# Patient Record
Sex: FEMALE | Race: WHITE | NOT HISPANIC OR LATINO
[De-identification: names, ages, dates, MRNs, and addresses within clinical notes are randomized per-mention and may not be internally consistent; named-entity substitution may affect disease eponyms.]

---

## 2018-04-30 ENCOUNTER — RESULT REVIEW (OUTPATIENT)
Age: 23
End: 2018-04-30

## 2018-04-30 ENCOUNTER — OUTPATIENT (OUTPATIENT)
Dept: OUTPATIENT SERVICES | Facility: HOSPITAL | Age: 23
LOS: 1 days | Discharge: HOME | End: 2018-04-30

## 2018-04-30 VITALS
RESPIRATION RATE: 21 BRPM | SYSTOLIC BLOOD PRESSURE: 108 MMHG | HEART RATE: 97 BPM | OXYGEN SATURATION: 99 % | DIASTOLIC BLOOD PRESSURE: 58 MMHG

## 2018-04-30 VITALS
OXYGEN SATURATION: 100 % | DIASTOLIC BLOOD PRESSURE: 60 MMHG | RESPIRATION RATE: 18 BRPM | TEMPERATURE: 99 F | WEIGHT: 220.46 LBS | HEART RATE: 68 BPM | SYSTOLIC BLOOD PRESSURE: 108 MMHG

## 2018-04-30 LAB
ABO RH CONFIRMATION: SIGNIFICANT CHANGE UP
BLD GP AB SCN SERPL QL: SIGNIFICANT CHANGE UP
TYPE + AB SCN PNL BLD: SIGNIFICANT CHANGE UP

## 2018-04-30 RX ORDER — SODIUM CHLORIDE 9 MG/ML
1000 INJECTION, SOLUTION INTRAVENOUS
Refills: 0 | Status: DISCONTINUED | OUTPATIENT
Start: 2018-04-30 | End: 2018-05-15

## 2018-04-30 RX ORDER — ONDANSETRON 8 MG/1
4 TABLET, FILM COATED ORAL ONCE
Refills: 0 | Status: DISCONTINUED | OUTPATIENT
Start: 2018-04-30 | End: 2018-05-15

## 2018-04-30 RX ORDER — ACETAMINOPHEN 500 MG
1000 TABLET ORAL ONCE
Refills: 0 | Status: COMPLETED | OUTPATIENT
Start: 2018-04-30 | End: 2018-04-30

## 2018-04-30 RX ORDER — KETOROLAC TROMETHAMINE 30 MG/ML
30 SYRINGE (ML) INJECTION ONCE
Refills: 0 | Status: DISCONTINUED | OUTPATIENT
Start: 2018-04-30 | End: 2018-04-30

## 2018-04-30 RX ADMIN — Medication 1000 MILLIGRAM(S): at 13:15

## 2018-04-30 RX ADMIN — Medication 400 MILLIGRAM(S): at 12:45

## 2018-04-30 RX ADMIN — SODIUM CHLORIDE 75 MILLILITER(S): 9 INJECTION, SOLUTION INTRAVENOUS at 13:02

## 2018-04-30 NOTE — BRIEF OPERATIVE NOTE - OPERATION/FINDINGS
8 week size, anteverted uterus, normal appearing vaginal and cervix. Moderate amount of tissue on curettage.

## 2018-04-30 NOTE — ASU DISCHARGE PLAN (ADULT/PEDIATRIC). - ASU FOLLOWUP
911 or go to the nearest Emergency Room [facility]:  [facility] Ambulatory [facility]...

## 2018-04-30 NOTE — BRIEF OPERATIVE NOTE - PROCEDURE
<<-----Click on this checkbox to enter Procedure Dilation and curettage of uterus for missed   2018    Active  APULLANO

## 2018-04-30 NOTE — H&P PST ADULT - HISTORY OF PRESENT ILLNESS
23 yo  at 11w0d GA by LMP of 18 here for scheduled suction dilation and curettage for missed . She denies acute complaints, bleeding, or cramping. She feels well overall. She had 2 transvaginal sonograms last Thursday that both supported the diagnosis of missed .    OB: , FT NSVDx2, SAB x1  GYN: Denies history of fibroids, cysts, abnormal paps, or STIs.

## 2018-05-03 DIAGNOSIS — O02.1 MISSED ABORTION: ICD-10-CM

## 2018-05-03 LAB — SURGICAL PATHOLOGY STUDY: SIGNIFICANT CHANGE UP

## 2018-05-21 LAB — CHROM ANALY OVERALL INTERP SPEC-IMP: SIGNIFICANT CHANGE UP

## 2018-05-23 PROBLEM — Z00.00 ENCOUNTER FOR PREVENTIVE HEALTH EXAMINATION: Noted: 2018-05-23

## 2019-06-01 ENCOUNTER — INPATIENT (INPATIENT)
Facility: HOSPITAL | Age: 24
LOS: 1 days | Discharge: HOME | End: 2019-06-03
Attending: OBSTETRICS & GYNECOLOGY | Admitting: OBSTETRICS & GYNECOLOGY
Payer: MEDICAID

## 2019-06-01 VITALS — TEMPERATURE: 98 F

## 2019-06-01 NOTE — OB PROVIDER TRIAGE NOTE - NS_CONTRACTPATTER_OBGYN_ALL_OB
October 3, 2017    Quique Martinez  [address] [place] [address]             [facility]  [address] [place] [address]  Phone: [phone] To whom it may concern:    Fabiano was seen in my office on 10/2/17. His ear was scratched while doing his exam.     If you have any questions or concerns, please don't hesitate to call.    Sincerely,        Anisha Coates MD     
Regular

## 2019-06-01 NOTE — OB PROVIDER TRIAGE NOTE - NSHPLABSRESULTS_GEN_ALL_CORE
Type and Screen: O neg s/p rhogam 2/27  Antibody screen: neg   Rubella: immune  Varicella: immune   RPR: neg  HbSAg: neg  HIV: NR  Measles: nonimmune    Second Trimester:  1 hour Glucola: 106    Third Trimester:  HIV: NR  GBS: neg

## 2019-06-01 NOTE — OB RN TRIAGE NOTE - NS_PARA_OBGYN_ALL_OB_NU
UA results complete.  Please advise.  Thanks!    Component Results     Component Value Ref Range & Units Status Collected Lab   COLOR YELLOW YELLOW Final 09/20/2017 12:30 PM [facility]   APPEARANCE CLOUDY  Final 09/20/2017 12:30 PM [facility]   GLUCOSE(URINE) NEGATIVE NEGATIVE mg/dL Final 09/20/2017 12:30 PM [facility]   BILIRUBIN NEGATIVE NEGATIVE Final 09/20/2017 12:30 PM [facility]   KETONES NEGATIVE NEGATIVE mg/dL Final 09/20/2017 12:30 PM [facility]   SPECIFIC GRAVITY 1.020 1.005 - 1.030 Final 09/20/2017 12:30 PM [facility]   BLOOD MODERATE  NEGATIVE Final 09/20/2017 12:30 PM [facility]   pH 5.5 5.0 - 7.0 Units Final 09/20/2017 12:30 PM [facility]   PROTEIN(URINE) 30  NEGATIVE mg/dL Final 09/20/2017 12:30 PM [facility]   UROBILINOGEN 0.2 0.0 - 1.0 mg/dL Final 09/20/2017 12:30 PM [facility]   NITRITE NEGATIVE NEGATIVE Final 09/20/2017 12:30 PM [facility]   LEUKOCYTE ESTERASE SMALL  NEGATIVE Final 09/20/2017 12:30 PM [facility]   Comment:   Culture indicated, results to follow.   Squamous EPI'S NONE SEEN 0 - 5 /hpf Final 09/20/2017 12:30 PM [facility]   RBC 1 to 3 0 - 3 /hpf Final 09/20/2017 12:30 PM [facility]   WBC 26 to 100 0 - 5 /hpf Final 09/20/2017 12:30 PM [facility]   Comment:   Culture indicated, results to follow.   BACTERIA MODERATE  NONE SEEN /hpf Final 09/20/2017 12:30 PM [facility]   Hyaline Casts NONE SEEN 0 - 5 /lpf Final 09/20/2017 12:30 PM [facility]   SPECIMEN TYPE             2

## 2019-06-01 NOTE — OB PROVIDER TRIAGE NOTE - NS_OBGYNHISTORY_OBGYN_ALL_OB_FT
ObHx:  x2, largest 6lb, SAB x2  GynHx: Denies history of STIs, abnormal pap, ovarian cysts, or fibroids

## 2019-06-01 NOTE — OB PROVIDER TRIAGE NOTE - NSHPPHYSICALEXAM_GEN_ALL_CORE
PHYSICAL EXAM:  T(F): 98.6 (06-01 @ 22:40)  HR: 88 (06-01 @ 22:40)  BP: 117/72 (06-01 @ 22:40)  RR: 18 (06-01 @ 22:40)  Constitutional: AAOx3, NAD  Abdomen: Soft, gravid, nontender, no palpable ctx  EFM: 140, mod deena, +accel  [place]: q5mins  SVE: 3/70/-2, vtx, intact

## 2019-06-01 NOTE — OB RN TRIAGE NOTE - CHIEF COMPLAINT QUOTE
pt is 40 2/, , lost her mucous plug this morning at 9am, and started having consistent contractions since 4pm

## 2019-06-01 NOTE — OB PROVIDER TRIAGE NOTE - HISTORY OF PRESENT ILLNESS
23  @ 40.2wks here with contractions, q5mins, 4-5/10 pain, started at this morning. Denies LOF or VB. Good fetal movement. Denies complications with the pregnancy.

## 2019-06-01 NOTE — OB PROVIDER TRIAGE NOTE - NSOBPROVIDERNOTE_OBGYN_ALL_OB_FT
23  @ 40.2wks, GBS pos, in early labor, desires ambulation  -labor precautions given  -ambulate for 2 hours then return for evaluation  -[facility] instructions given 23  @ 40.2wks, GBS pos, in early labor, with reassuring maternal and fetal wellbeing, desires ambulation  -labor precautions given  -ambulate for 2 hours then return for evaluation  -[facility] instructions given 23  @ 40.2wks, GBS pos, in early labor, with reassuring maternal and fetal wellbeing, desires ambulation  -labor precautions given  -ambulate for 2 hours then return for evaluation  -[facility] instructions given  As above, patient will return post ambulation

## 2019-06-02 LAB
ALLERGY+IMMUNOLOGY DIAG STUDY NOTE: SIGNIFICANT CHANGE UP
APPEARANCE UR: ABNORMAL
BACTERIA # UR AUTO: ABNORMAL /HPF
BASOPHILS # BLD AUTO: 0.03 K/UL — SIGNIFICANT CHANGE UP (ref 0–0.2)
BASOPHILS NFR BLD AUTO: 0.4 % — SIGNIFICANT CHANGE UP (ref 0–1)
BILIRUB UR-MCNC: NEGATIVE — SIGNIFICANT CHANGE UP
BLD GP AB SCN SERPL QL: SIGNIFICANT CHANGE UP
COLOR SPEC: YELLOW — SIGNIFICANT CHANGE UP
DIFF PNL FLD: NEGATIVE — SIGNIFICANT CHANGE UP
DIR ANTIGLOB POLYSPECIFIC INTERPRETATION: SIGNIFICANT CHANGE UP
EOSINOPHIL # BLD AUTO: 0.04 K/UL — SIGNIFICANT CHANGE UP (ref 0–0.7)
EOSINOPHIL NFR BLD AUTO: 0.5 % — SIGNIFICANT CHANGE UP (ref 0–8)
EPI CELLS # UR: ABNORMAL /HPF
GLUCOSE UR QL: NEGATIVE MG/DL — SIGNIFICANT CHANGE UP
HCT VFR BLD CALC: 32.4 % — LOW (ref 37–47)
HCT VFR BLD CALC: 34.1 % — LOW (ref 37–47)
HGB BLD-MCNC: 10.1 G/DL — LOW (ref 12–16)
HGB BLD-MCNC: 10.9 G/DL — LOW (ref 12–16)
IMM GRANULOCYTES NFR BLD AUTO: 0.4 % — HIGH (ref 0.1–0.3)
KETONES UR-MCNC: NEGATIVE — SIGNIFICANT CHANGE UP
LEUKOCYTE ESTERASE UR-ACNC: ABNORMAL
LYMPHOCYTES # BLD AUTO: 1.79 K/UL — SIGNIFICANT CHANGE UP (ref 1.2–3.4)
LYMPHOCYTES # BLD AUTO: 21.2 % — SIGNIFICANT CHANGE UP (ref 20.5–51.1)
MCHC RBC-ENTMCNC: 23.6 PG — LOW (ref 27–31)
MCHC RBC-ENTMCNC: 23.9 PG — LOW (ref 27–31)
MCHC RBC-ENTMCNC: 31.2 G/DL — LOW (ref 32–37)
MCHC RBC-ENTMCNC: 32 G/DL — SIGNIFICANT CHANGE UP (ref 32–37)
MCV RBC AUTO: 74.6 FL — LOW (ref 81–99)
MCV RBC AUTO: 75.7 FL — LOW (ref 81–99)
MONOCYTES # BLD AUTO: 0.51 K/UL — SIGNIFICANT CHANGE UP (ref 0.1–0.6)
MONOCYTES NFR BLD AUTO: 6 % — SIGNIFICANT CHANGE UP (ref 1.7–9.3)
NEUTROPHILS # BLD AUTO: 6.05 K/UL — SIGNIFICANT CHANGE UP (ref 1.4–6.5)
NEUTROPHILS NFR BLD AUTO: 71.5 % — SIGNIFICANT CHANGE UP (ref 42.2–75.2)
NITRITE UR-MCNC: NEGATIVE — SIGNIFICANT CHANGE UP
NRBC # BLD: 0 /100 WBCS — SIGNIFICANT CHANGE UP (ref 0–0)
NRBC # BLD: 0 /100 WBCS — SIGNIFICANT CHANGE UP (ref 0–0)
PH UR: 6.5 — SIGNIFICANT CHANGE UP (ref 5–8)
PLATELET # BLD AUTO: 153 K/UL — SIGNIFICANT CHANGE UP (ref 130–400)
PLATELET # BLD AUTO: 180 K/UL — SIGNIFICANT CHANGE UP (ref 130–400)
PRENATAL SYPHILIS TEST: SIGNIFICANT CHANGE UP
PROT UR-MCNC: ABNORMAL MG/DL
RBC # BLD: 4.28 M/UL — SIGNIFICANT CHANGE UP (ref 4.2–5.4)
RBC # BLD: 4.57 M/UL — SIGNIFICANT CHANGE UP (ref 4.2–5.4)
RBC # FLD: 20.3 % — HIGH (ref 11.5–14.5)
RBC # FLD: 20.5 % — HIGH (ref 11.5–14.5)
SP GR SPEC: 1.01 — SIGNIFICANT CHANGE UP (ref 1.01–1.03)
UROBILINOGEN FLD QL: 0.2 MG/DL — SIGNIFICANT CHANGE UP (ref 0.2–0.2)
WBC # BLD: 7.13 K/UL — SIGNIFICANT CHANGE UP (ref 4.8–10.8)
WBC # BLD: 8.45 K/UL — SIGNIFICANT CHANGE UP (ref 4.8–10.8)
WBC # FLD AUTO: 7.13 K/UL — SIGNIFICANT CHANGE UP (ref 4.8–10.8)
WBC # FLD AUTO: 8.45 K/UL — SIGNIFICANT CHANGE UP (ref 4.8–10.8)
WBC UR QL: >50 /HPF

## 2019-06-02 PROCEDURE — 59400 OBSTETRICAL CARE: CPT | Mod: U9

## 2019-06-02 RX ORDER — TETANUS TOXOID, REDUCED DIPHTHERIA TOXOID AND ACELLULAR PERTUSSIS VACCINE, ADSORBED 5; 2.5; 8; 8; 2.5 [IU]/.5ML; [IU]/.5ML; UG/.5ML; UG/.5ML; UG/.5ML
0.5 SUSPENSION INTRAMUSCULAR ONCE
Refills: 0 | Status: DISCONTINUED | OUTPATIENT
Start: 2019-06-02 | End: 2019-06-03

## 2019-06-02 RX ORDER — SODIUM CHLORIDE 9 MG/ML
3 INJECTION INTRAMUSCULAR; INTRAVENOUS; SUBCUTANEOUS EVERY 8 HOURS
Refills: 0 | Status: DISCONTINUED | OUTPATIENT
Start: 2019-06-02 | End: 2019-06-03

## 2019-06-02 RX ORDER — IBUPROFEN 200 MG
600 TABLET ORAL EVERY 6 HOURS
Refills: 0 | Status: DISCONTINUED | OUTPATIENT
Start: 2019-06-02 | End: 2019-06-03

## 2019-06-02 RX ORDER — DIPHENHYDRAMINE HCL 50 MG
25 CAPSULE ORAL EVERY 6 HOURS
Refills: 0 | Status: DISCONTINUED | OUTPATIENT
Start: 2019-06-02 | End: 2019-06-03

## 2019-06-02 RX ORDER — OXYCODONE HYDROCHLORIDE 5 MG/1
5 TABLET ORAL
Refills: 0 | Status: DISCONTINUED | OUTPATIENT
Start: 2019-06-02 | End: 2019-06-03

## 2019-06-02 RX ORDER — MAGNESIUM HYDROXIDE 400 MG/1
30 TABLET, CHEWABLE ORAL
Refills: 0 | Status: DISCONTINUED | OUTPATIENT
Start: 2019-06-02 | End: 2019-06-03

## 2019-06-02 RX ORDER — DOCUSATE SODIUM 100 MG
100 CAPSULE ORAL
Refills: 0 | Status: DISCONTINUED | OUTPATIENT
Start: 2019-06-02 | End: 2019-06-03

## 2019-06-02 RX ORDER — SODIUM CHLORIDE 9 MG/ML
1000 INJECTION, SOLUTION INTRAVENOUS
Refills: 0 | Status: DISCONTINUED | OUTPATIENT
Start: 2019-06-02 | End: 2019-06-02

## 2019-06-02 RX ORDER — BENZOCAINE 10 %
1 GEL (GRAM) MUCOUS MEMBRANE EVERY 6 HOURS
Refills: 0 | Status: DISCONTINUED | OUTPATIENT
Start: 2019-06-02 | End: 2019-06-03

## 2019-06-02 RX ORDER — GLYCERIN ADULT
1 SUPPOSITORY, RECTAL RECTAL AT BEDTIME
Refills: 0 | Status: DISCONTINUED | OUTPATIENT
Start: 2019-06-02 | End: 2019-06-03

## 2019-06-02 RX ORDER — AER TRAVELER 0.5 G/1
1 SOLUTION RECTAL; TOPICAL EVERY 4 HOURS
Refills: 0 | Status: DISCONTINUED | OUTPATIENT
Start: 2019-06-02 | End: 2019-06-03

## 2019-06-02 RX ORDER — OXYTOCIN 10 UNIT/ML
2 VIAL (ML) INJECTION
Qty: 30 | Refills: 0 | Status: DISCONTINUED | OUTPATIENT
Start: 2019-06-02 | End: 2019-06-02

## 2019-06-02 RX ORDER — SIMETHICONE 80 MG/1
80 TABLET, CHEWABLE ORAL EVERY 4 HOURS
Refills: 0 | Status: DISCONTINUED | OUTPATIENT
Start: 2019-06-02 | End: 2019-06-03

## 2019-06-02 RX ORDER — IBUPROFEN 200 MG
600 TABLET ORAL EVERY 6 HOURS
Refills: 0 | Status: COMPLETED | OUTPATIENT
Start: 2019-06-02 | End: 2020-04-30

## 2019-06-02 RX ORDER — LANOLIN
1 OINTMENT (GRAM) TOPICAL EVERY 6 HOURS
Refills: 0 | Status: DISCONTINUED | OUTPATIENT
Start: 2019-06-02 | End: 2019-06-03

## 2019-06-02 RX ORDER — HYDROCORTISONE 1 %
1 OINTMENT (GRAM) TOPICAL EVERY 6 HOURS
Refills: 0 | Status: DISCONTINUED | OUTPATIENT
Start: 2019-06-02 | End: 2019-06-03

## 2019-06-02 RX ORDER — OXYCODONE HYDROCHLORIDE 5 MG/1
5 TABLET ORAL ONCE
Refills: 0 | Status: DISCONTINUED | OUTPATIENT
Start: 2019-06-02 | End: 2019-06-03

## 2019-06-02 RX ORDER — OXYTOCIN 10 UNIT/ML
333.33 VIAL (ML) INJECTION
Qty: 20 | Refills: 0 | Status: DISCONTINUED | OUTPATIENT
Start: 2019-06-02 | End: 2019-06-03

## 2019-06-02 RX ORDER — PRAMOXINE HYDROCHLORIDE 150 MG/15G
1 AEROSOL, FOAM RECTAL EVERY 4 HOURS
Refills: 0 | Status: DISCONTINUED | OUTPATIENT
Start: 2019-06-02 | End: 2019-06-02

## 2019-06-02 RX ORDER — DIBUCAINE 1 %
1 OINTMENT (GRAM) RECTAL EVERY 6 HOURS
Refills: 0 | Status: DISCONTINUED | OUTPATIENT
Start: 2019-06-02 | End: 2019-06-03

## 2019-06-02 RX ORDER — KETOROLAC TROMETHAMINE 30 MG/ML
30 SYRINGE (ML) INJECTION ONCE
Refills: 0 | Status: DISCONTINUED | OUTPATIENT
Start: 2019-06-02 | End: 2019-06-02

## 2019-06-02 RX ORDER — ACETAMINOPHEN 500 MG
975 TABLET ORAL EVERY 6 HOURS
Refills: 0 | Status: DISCONTINUED | OUTPATIENT
Start: 2019-06-02 | End: 2019-06-03

## 2019-06-02 RX ADMIN — SODIUM CHLORIDE 3 MILLILITER(S): 9 INJECTION INTRAMUSCULAR; INTRAVENOUS; SUBCUTANEOUS at 14:32

## 2019-06-02 RX ADMIN — Medication 600 MILLIGRAM(S): at 21:12

## 2019-06-02 RX ADMIN — Medication 975 MILLIGRAM(S): at 23:30

## 2019-06-02 RX ADMIN — Medication 1 TABLET(S): at 11:58

## 2019-06-02 RX ADMIN — Medication 975 MILLIGRAM(S): at 18:22

## 2019-06-02 RX ADMIN — Medication 975 MILLIGRAM(S): at 11:58

## 2019-06-02 RX ADMIN — Medication 30 MILLIGRAM(S): at 08:55

## 2019-06-02 RX ADMIN — SODIUM CHLORIDE 3 MILLILITER(S): 9 INJECTION INTRAMUSCULAR; INTRAVENOUS; SUBCUTANEOUS at 22:14

## 2019-06-02 NOTE — OB PROVIDER H&P - ASSESSMENT
24yo  at 40w3d, GBS negative, measles non-immune, in labor    -Admit to labor and delivery  -IV hydration and clear liquid diet  -Pain management PRN  -Cont efm and toco  -Rhogam PP  -MMR PP    Dr. Domínguez and Dr. Sosa aware

## 2019-06-02 NOTE — PROGRESS NOTE ADULT - SUBJECTIVE AND OBJECTIVE BOX
PGY2 Note:    Pt evaluated at bedside, pain well controlled with epidural in place.     Vital Signs Last 24 Hrs  T(F): 98.6 (2019 22:40), Max: 98.6 (2019 22:40)  HR: 62 (2019 03:54) (62 - 88)  BP: 102/55 (2019 03:54) (102/55 - 128/80)  RR: 18 (2019 22:40) (18 - 18)    EFM: 120/mod/accels  [place]: irregular  SVE: last exam 3-4/90/-1 per Dr. TAMMY Sosa    Labs:                        10.9   8.45  )-----------( 180      ( 2019 01:10 )             34.1     Urinalysis Basic - ( 2019 01:10 )    Color: Yellow / Appearance: Cloudy / S.015 / pH: x  Gluc: x / Ketone: Negative  / Bili: Negative / Urobili: 0.2 mg/dL   Blood: x / Protein: Trace mg/dL / Nitrite: Negative   Leuk Esterase: Large / RBC: x / WBC >50 /HPF   Sq Epi: x / Non Sq Epi: Moderate /HPF / Bacteria: Moderate /HPF    Prenatal Syphilis Test (19 @ 01:10)    Prenatal Syphilis Test: Nonreact: Test Performed at:  [address]  [address]    Type + Screen (19 @ 01:10)    ABO RH Interpretation: AB NEG    UDS pending      Meds:  Epidural in place

## 2019-06-02 NOTE — OB PROVIDER H&P - HISTORY OF PRESENT ILLNESS
22yo  at 40w3d by LMP c/w first trimester sonogram s/p ambulation with contractions, q5mins, 4-5/10 pain, started at this morning. Denies LOF or VB. Good fetal movement. Denies complications with the pregnancy. GBS negative

## 2019-06-02 NOTE — PROGRESS NOTE ADULT - ASSESSMENT
24yo  at 40w3d, GBS negative, measles non-immune, in labor    -Anticipate delivery  -IV hydration and clear liquid diet  -Pain management PRN  -Cont efm and toco  -Rhogam PP  -MMR PP    Dr. Stone and Dr. Sosa aware

## 2019-06-02 NOTE — PROGRESS NOTE ADULT - SUBJECTIVE AND OBJECTIVE BOX
PGY1 Note    Patient seen at bedside for evaluation, doing well, no complaints    T(F): 98.6 ( @ 22:40), Max: 98.6 ( @ 22:40)  HR: 75 ( @ 06:24)  BP: 119/61 ( @ 06:24) (83/53 - 138/61)  RR: 18 ( @ 22:40)  EFM: 115/mod/spontaneous late decelerations with margaret of 90 recovering to baseline  with resuscitation   TOCO: q 2-3min  SVE: 10/100/+1  Medications:  Epidural    Labs:                        10.9   8.45  )-----------( 180      ( 2019 01:10 )             34.1           Prenatal Syphilis Test: Nonreact ( @ 01:10)  Antibody Screen: POS (19 @ 01:10)  Antibody Interpretation 1: Anti-D due to RhIG Admin (19 @ 01:10)    Urinalysis Basic - ( 2019 01:10 )    Color: Yellow / Appearance: Cloudy / S.015 / pH: x  Gluc: x / Ketone: Negative  / Bili: Negative / Urobili: 0.2 mg/dL   Blood: x / Protein: Trace mg/dL / Nitrite: Negative   Leuk Esterase: Large / RBC: x / WBC >50 /HPF   Sq Epi: x / Non Sq Epi: Moderate /HPF / Bacteria: Moderate /HPF

## 2019-06-02 NOTE — PROCEDURE NOTE - ADDITIONAL PROCEDURE DETAILS
Epidural catheter removed intact.  Return to sensorimotor baseline.  No apparent anesthesia-related complications.

## 2019-06-02 NOTE — OB PROVIDER H&P - ATTENDING COMMENTS
24 y/o p2022 at 40.3 wks w/c/o irreg ctx, +fm, no rom, no bleeding.    pnc: nsd x 2, largest 6+ lbs  gbs neg  abd: a=0698 g, nt  ext: no edema  cx: 3-4/90/-1/i/adeq  nst: reactive  toco: irreg ctx  admit, analgesia, arom, anticipate nsd 22 y/o p2022 at 40.3 wks w/c/o irreg ctx, +fm, no rom, no bleeding.    pnc: nsd x 2, largest 6+ lbs  gbs neg, rh neg, got rhogam, measles non immune  abd: g=0624 g, nt  ext: no edema  cx: 3-4/90/-1/i/adeq  nst: reactive  toco: irreg ctx  admit, analgesia, arom, anticipate nsd 24 y/o p2022 at 40.3 wks w/c/o irreg ctx, +fm, no rom, no bleeding.    pnc: nsd x 2, largest 6+ lbs  gbs neg, rh neg, got rhogam, measles non immune  abd: q=9347 g, nt  ext: no edema  cx: 3-4/90/-1/i/adeq  nst: reactive  toco: irreg ctx  admit, analgesia, arom, pitocin prn, r/b/a reviewed, ques answered, anticipate nsd

## 2019-06-02 NOTE — OB PROVIDER H&P - NSHPLABSRESULTS_GEN_ALL_CORE
10/22/18   Type and Screen: O neg s/p rhogam 2/27  Antibody screen: neg   Rubella: immune  Varicella: immune   RPR: neg  HbSAg: neg  HIV: NR  Measles: nonimmune    Second Trimester:  1 hour Glucola: 106    Third Trimester:  HIV: NR  GBS: neg    sonogram  1/29/19 3vc, 4ch, post placenta, MVP 5.34cm, EFW 480g (20%)

## 2019-06-02 NOTE — PROGRESS NOTE ADULT - ASSESSMENT
24yo  at 40w3d, GBS negative, measles non-immune, epidural in place, in labor  -Continuous EFM and toco  -IV fluids  -Pain management PRN  -Pitocin for labor augmentation    Discussed with Dr. TAMMY Sosa

## 2019-06-02 NOTE — OB PROVIDER DELIVERY SUMMARY - NSPROVIDERDELIVERYNOTE_OBGYN_ALL_OB_FT
Patient fully dilated, OA, pushed to deliver viable male .  Apgar 9/9.  Placenta intact with 3vc.  Cervix, vagina, perineum intact.   cc.  Tolerated well.  Infant to [facility].

## 2019-06-02 NOTE — OB PROVIDER IHI INDUCTION/AUGMENTATION NOTE - NS_CHECKALL_OBGYN_ALL_OB
H&P was completed/Contractions pattern was reviewed/FHR was reviewed/Induction / Augmentation was discussed/Order was written

## 2019-06-02 NOTE — OB PROVIDER H&P - NSHPPHYSICALEXAM_GEN_ALL_CORE
Physical exam:  Vital Signs Last 24 Hrs  T(F): 98.6 (01 Jun 2019 22:40), Max: 98.6 (01 Jun 2019 22:40)  HR: 88 (02 Jun 2019 00:53) (88 - 88)  BP: 128/80 (02 Jun 2019 00:53) (117/72 - 128/80)  RR: 18 (01 Jun 2019 22:40) (18 - 18)  Udip trace protein, trace leuks  Gen: NAD  Abdomen: Soft, nontender, gravid  VE: 3.5/90/-1 vertex, intact per Dr. Sosa   EFM: 120/mod/+accels  toco: q3-5min  BPP 8/8

## 2019-06-03 ENCOUNTER — TRANSCRIPTION ENCOUNTER (OUTPATIENT)
Age: 24
End: 2019-06-03

## 2019-06-03 VITALS
TEMPERATURE: 97 F | SYSTOLIC BLOOD PRESSURE: 120 MMHG | DIASTOLIC BLOOD PRESSURE: 67 MMHG | RESPIRATION RATE: 18 BRPM | HEART RATE: 80 BPM

## 2019-06-03 LAB
AMPHET UR-MCNC: NEGATIVE — SIGNIFICANT CHANGE UP
BARBITURATES UR SCN-MCNC: NEGATIVE — SIGNIFICANT CHANGE UP
BASOPHILS # BLD AUTO: 0.04 K/UL — SIGNIFICANT CHANGE UP (ref 0–0.2)
BASOPHILS NFR BLD AUTO: 0.7 % — SIGNIFICANT CHANGE UP (ref 0–1)
BENZODIAZ UR-MCNC: NEGATIVE — SIGNIFICANT CHANGE UP
BLD GP AB SCN SERPL QL: SIGNIFICANT CHANGE UP
BUPRENORPHINE SCREEN, URINE RESULT: NEGATIVE — SIGNIFICANT CHANGE UP
COCAINE METAB.OTHER UR-MCNC: NEGATIVE — SIGNIFICANT CHANGE UP
EOSINOPHIL # BLD AUTO: 0.1 K/UL — SIGNIFICANT CHANGE UP (ref 0–0.7)
EOSINOPHIL NFR BLD AUTO: 1.7 % — SIGNIFICANT CHANGE UP (ref 0–8)
FETAL SCREEN: SIGNIFICANT CHANGE UP
HCT VFR BLD CALC: 34.6 % — LOW (ref 37–47)
HGB BLD-MCNC: 10.6 G/DL — LOW (ref 12–16)
IMM GRANULOCYTES NFR BLD AUTO: 0.3 % — SIGNIFICANT CHANGE UP (ref 0.1–0.3)
KLEIHAUER-BETKE CALCULATION: 0 % — SIGNIFICANT CHANGE UP (ref 0–0.3)
L&D DRUG SCREEN, URINE: SIGNIFICANT CHANGE UP
LYMPHOCYTES # BLD AUTO: 1.78 K/UL — SIGNIFICANT CHANGE UP (ref 1.2–3.4)
LYMPHOCYTES # BLD AUTO: 29.4 % — SIGNIFICANT CHANGE UP (ref 20.5–51.1)
MCHC RBC-ENTMCNC: 23.3 PG — LOW (ref 27–31)
MCHC RBC-ENTMCNC: 30.6 G/DL — LOW (ref 32–37)
MCV RBC AUTO: 76.2 FL — LOW (ref 81–99)
METHADONE UR-MCNC: NEGATIVE — SIGNIFICANT CHANGE UP
MONOCYTES # BLD AUTO: 0.41 K/UL — SIGNIFICANT CHANGE UP (ref 0.1–0.6)
MONOCYTES NFR BLD AUTO: 6.8 % — SIGNIFICANT CHANGE UP (ref 1.7–9.3)
NEUTROPHILS # BLD AUTO: 3.7 K/UL — SIGNIFICANT CHANGE UP (ref 1.4–6.5)
NEUTROPHILS NFR BLD AUTO: 61.1 % — SIGNIFICANT CHANGE UP (ref 42.2–75.2)
NRBC # BLD: 0 /100 WBCS — SIGNIFICANT CHANGE UP (ref 0–0)
OPIATES UR-MCNC: NEGATIVE — SIGNIFICANT CHANGE UP
OXYCODONE UR-MCNC: NEGATIVE — SIGNIFICANT CHANGE UP
PCP UR-MCNC: NEGATIVE — SIGNIFICANT CHANGE UP
PLATELET # BLD AUTO: 146 K/UL — SIGNIFICANT CHANGE UP (ref 130–400)
PROPOXYPHENE QUALITATIVE URINE RESULT: NEGATIVE — SIGNIFICANT CHANGE UP
RBC # BLD: 4.54 M/UL — SIGNIFICANT CHANGE UP (ref 4.2–5.4)
RBC # FLD: 20.6 % — HIGH (ref 11.5–14.5)
WBC # BLD: 6.05 K/UL — SIGNIFICANT CHANGE UP (ref 4.8–10.8)
WBC # FLD AUTO: 6.05 K/UL — SIGNIFICANT CHANGE UP (ref 4.8–10.8)

## 2019-06-03 PROCEDURE — 86077 PHYS BLOOD BANK SERV XMATCH: CPT

## 2019-06-03 RX ORDER — IBUPROFEN 200 MG
1 TABLET ORAL
Qty: 0 | Refills: 0 | DISCHARGE
Start: 2019-06-03

## 2019-06-03 RX ORDER — ACETAMINOPHEN 500 MG
3 TABLET ORAL
Qty: 0 | Refills: 0 | DISCHARGE
Start: 2019-06-03

## 2019-06-03 RX ADMIN — Medication 600 MILLIGRAM(S): at 09:06

## 2019-06-03 RX ADMIN — Medication 975 MILLIGRAM(S): at 05:51

## 2019-06-03 RX ADMIN — Medication 0.5 MILLILITER(S): at 12:15

## 2019-06-03 RX ADMIN — Medication 975 MILLIGRAM(S): at 12:42

## 2019-06-03 RX ADMIN — Medication 1 TABLET(S): at 12:42

## 2019-06-03 NOTE — PROGRESS NOTE ADULT - SUBJECTIVE AND OBJECTIVE BOX
Subjective:   Patient doing well. No complaints. Minimal lochia. Pain controlled.    Objective:   T(F): 97.3 ( @ 07:42), Max: 97.4 (-02 @ 15:34)  HR: 80 ( @ 07:42)  BP: 120/67 ( @ 07:42) (108/54 - 122/69)  RR: 18 ( @ 07:42)  SpO2: --  Gen: AAOx3, NAD  Abd: Soft, Nontender, Nondistended, Fundus firm below the umbilicus  Ext: no tender, mild edema  Min Lochia Rubra    Labs:                        10.6   6.05  )-----------( 146      ( 2019 04:55 )             34.6             Tolerating regular diet  Passed flatus, passed bowel movement  Breast/Bottle feeding    Assessment:   23y s/p , PPD#1, doing well    Plan:  -Routine postpartum care  -Encouraged ambulation and PO hydration  -Tolerating regular diet

## 2019-06-03 NOTE — DISCHARGE NOTE OB - ADDITIONAL INSTRUCTIONS
Nothing in the vagina for 6 weeks. No tampons, no douching, no sex. No swimming, no tub-baths. May shower.  May remove dressings in 2days.  If fever 100.4F or greater, excessive vaginal bleeding, or severe abdominal pain, call your Ob/Gyn or come to ED or call 911.

## 2019-06-03 NOTE — DISCHARGE NOTE OB - CARE PLAN
Principal Discharge DX:	Vaginal delivery  Goal:	healthy patient  Assessment and plan of treatment:	normal vaginal delivery and postpartum course  f/u in office in 6 weeks

## 2019-06-03 NOTE — DISCHARGE NOTE OB - CARE PROVIDER_API CALL
Acosta Sosa)  Obstetrics and Gynecology  [address] [place] [address]  Phone: [phone]  Fax: [phone]  Follow Up Time: 1 month

## 2019-06-03 NOTE — DISCHARGE NOTE OB - PATIENT PORTAL LINK FT
You can access the [facility] Patient Portal, offered by [facility], by registering with the following website: [email or web address]

## 2019-06-06 DIAGNOSIS — Z34.83 ENCOUNTER FOR SUPERVISION OF OTHER NORMAL PREGNANCY, THIRD TRIMESTER: ICD-10-CM

## 2020-03-09 ENCOUNTER — APPOINTMENT (OUTPATIENT)
Dept: ANTEPARTUM | Facility: CLINIC | Age: 25
End: 2020-03-09

## 2020-03-11 ENCOUNTER — APPOINTMENT (OUTPATIENT)
Dept: ANTEPARTUM | Facility: CLINIC | Age: 25
End: 2020-03-11
Payer: COMMERCIAL

## 2020-03-11 ENCOUNTER — ASOB RESULT (OUTPATIENT)
Age: 25
End: 2020-03-11

## 2020-03-11 ENCOUNTER — RESULT REVIEW (OUTPATIENT)
Age: 25
End: 2020-03-11

## 2020-03-11 ENCOUNTER — OUTPATIENT (OUTPATIENT)
Dept: OUTPATIENT SERVICES | Facility: HOSPITAL | Age: 25
LOS: 1 days | Discharge: HOME | End: 2020-03-11
Payer: COMMERCIAL

## 2020-03-11 ENCOUNTER — OUTPATIENT (OUTPATIENT)
Dept: OUTPATIENT SERVICES | Facility: HOSPITAL | Age: 25
LOS: 1 days | Discharge: HOME | End: 2020-03-11

## 2020-03-11 VITALS
RESPIRATION RATE: 18 BRPM | WEIGHT: 100.09 LBS | HEIGHT: 60 IN | DIASTOLIC BLOOD PRESSURE: 58 MMHG | TEMPERATURE: 99 F | OXYGEN SATURATION: 100 % | HEART RATE: 85 BPM | SYSTOLIC BLOOD PRESSURE: 110 MMHG

## 2020-03-11 VITALS
OXYGEN SATURATION: 100 % | HEART RATE: 85 BPM | SYSTOLIC BLOOD PRESSURE: 121 MMHG | RESPIRATION RATE: 16 BRPM | DIASTOLIC BLOOD PRESSURE: 69 MMHG

## 2020-03-11 DIAGNOSIS — Z98.890 OTHER SPECIFIED POSTPROCEDURAL STATES: Chronic | ICD-10-CM

## 2020-03-11 LAB — BLD GP AB SCN SERPL QL: SIGNIFICANT CHANGE UP

## 2020-03-11 PROCEDURE — 59820 CARE OF MISCARRIAGE: CPT

## 2020-03-11 PROCEDURE — 76801 OB US < 14 WKS SINGLE FETUS: CPT | Mod: 26

## 2020-03-11 PROCEDURE — 76817 TRANSVAGINAL US OBSTETRIC: CPT | Mod: 26

## 2020-03-11 PROCEDURE — 88305 TISSUE EXAM BY PATHOLOGIST: CPT | Mod: 26

## 2020-03-11 RX ORDER — SODIUM CHLORIDE 9 MG/ML
1000 INJECTION, SOLUTION INTRAVENOUS
Refills: 0 | Status: DISCONTINUED | OUTPATIENT
Start: 2020-03-11 | End: 2020-03-26

## 2020-03-11 RX ORDER — ONDANSETRON 8 MG/1
4 TABLET, FILM COATED ORAL ONCE
Refills: 0 | Status: DISCONTINUED | OUTPATIENT
Start: 2020-03-11 | End: 2020-03-26

## 2020-03-11 RX ORDER — HYDROMORPHONE HYDROCHLORIDE 2 MG/ML
0.5 INJECTION INTRAMUSCULAR; INTRAVENOUS; SUBCUTANEOUS
Refills: 0 | Status: DISCONTINUED | OUTPATIENT
Start: 2020-03-11 | End: 2020-03-11

## 2020-03-11 RX ORDER — OXYCODONE AND ACETAMINOPHEN 5; 325 MG/1; MG/1
1 TABLET ORAL ONCE
Refills: 0 | Status: DISCONTINUED | OUTPATIENT
Start: 2020-03-11 | End: 2020-03-11

## 2020-03-11 RX ADMIN — SODIUM CHLORIDE 100 MILLILITER(S): 9 INJECTION, SOLUTION INTRAVENOUS at 14:46

## 2020-03-11 NOTE — ASU DISCHARGE PLAN (ADULT/PEDIATRIC) - CARE PROVIDER_API CALL
[place])  Obstetrics and Gynecology  [address] [place] [address]  Phone: [phone]  Fax: [phone]  Follow Up Time:

## 2020-03-11 NOTE — CHART NOTE - NSCHARTNOTEFT_GEN_A_CORE
PACU ANESTHESIA ADMISSION NOTE      Procedure: Dilation and curettage, uterus, using suction, for missed first trimester     Post op diagnosis:  Missed       ____  Intubated  TV:______       Rate: ______      FiO2: ______    __x__  Patent Airway    __x__  Full return of protective reflexes    __x__  Full recovery from anesthesia / back to baseline status    Vitals  HR: 110  BP: 122/65  RR: 18  O2 Sat: 99%  Temp: 97.8    Mental Status:  __x__ Awake   ___x__ Alert   _____ Drowsy   _____ Sedated    Nausea/Vomiting:  __x__ NO  ______Yes,   See Post - Op Orders          Pain Scale (0-10):  _____    Treatment: ____ None    __x__ See Post - Op/PCA Orders    Post - Operative Fluids:   ____ Oral   __x__ See Post - Op Orders    Plan: Discharge when criteria met:   __x__Home       _____Floor     _____Critical Care    _____  Other:_________________    Comments: Patient had smooth intraoperative event, no anesthesia complication.

## 2020-03-11 NOTE — H&P PST ADULT - HISTORY OF PRESENT ILLNESS
23yo  with unknown LMP here for scheduled dilation and curettage for missed . Sonogram on 3/11 demonstrated a gestational sac measuring 4mm. Denies any episodes of VB or cramping.    ObHx: 3 FT   2 SAB with D&Cx1    GynHx: Denies h/o fibroids, cysts, abnormal paps or STIs.

## 2020-03-11 NOTE — BRIEF OPERATIVE NOTE - NSICDXBRIEFPROCEDURE_GEN_ALL_CORE_FT
PROCEDURES:  Dilation and curettage, uterus, using suction, for missed first trimester  11-Mar-2020 14:21:21  Chery Yang

## 2020-03-11 NOTE — H&P PST ADULT - ASSESSMENT
A/P: 25yo  with missed  measuring 4mm, for scheduled suction dilation and curettage  - on call to OR

## 2020-03-11 NOTE — PRE-ANESTHESIA EVALUATION ADULT - NSANTHOSAYNRD_GEN_A_CORE
No. KALE screening performed.  STOP BANG Legend: 0-2 = LOW Risk; 3-4 = INTERMEDIATE Risk; 5-8 = HIGH Risk

## 2020-03-13 LAB — SURGICAL PATHOLOGY STUDY: SIGNIFICANT CHANGE UP

## 2020-03-16 DIAGNOSIS — O36.80X0 PREGNANCY WITH INCONCLUSIVE FETAL VIABILITY, NOT APPLICABLE OR UNSPECIFIED: ICD-10-CM

## 2020-03-16 DIAGNOSIS — O36.80X1 PREGNANCY WITH INCONCLUSIVE FETAL VIABILITY, FETUS 1: ICD-10-CM

## 2020-03-16 DIAGNOSIS — Z78.9 OTHER SPECIFIED HEALTH STATUS: ICD-10-CM

## 2020-03-18 DIAGNOSIS — O02.1 MISSED ABORTION: ICD-10-CM

## 2020-03-31 PROBLEM — Z78.9 OTHER SPECIFIED HEALTH STATUS: Chronic | Status: ACTIVE | Noted: 2019-06-01

## 2020-09-02 ENCOUNTER — ASOB RESULT (OUTPATIENT)
Age: 25
End: 2020-09-02

## 2020-09-02 ENCOUNTER — APPOINTMENT (OUTPATIENT)
Dept: ANTEPARTUM | Facility: CLINIC | Age: 25
End: 2020-09-02
Payer: COMMERCIAL

## 2020-09-02 PROCEDURE — 76817 TRANSVAGINAL US OBSTETRIC: CPT

## 2020-09-02 PROCEDURE — 76811 OB US DETAILED SNGL FETUS: CPT

## 2020-12-30 ENCOUNTER — INPATIENT (INPATIENT)
Facility: HOSPITAL | Age: 25
LOS: 0 days | Discharge: HOME | End: 2020-12-31
Attending: OBSTETRICS & GYNECOLOGY | Admitting: OBSTETRICS & GYNECOLOGY
Payer: MEDICAID

## 2020-12-30 VITALS
SYSTOLIC BLOOD PRESSURE: 116 MMHG | TEMPERATURE: 99 F | HEART RATE: 74 BPM | DIASTOLIC BLOOD PRESSURE: 74 MMHG | RESPIRATION RATE: 16 BRPM

## 2020-12-30 DIAGNOSIS — Z98.890 OTHER SPECIFIED POSTPROCEDURAL STATES: Chronic | ICD-10-CM

## 2020-12-30 LAB
ALLERGY+IMMUNOLOGY DIAG STUDY NOTE: SIGNIFICANT CHANGE UP
AMPHET UR-MCNC: NEGATIVE — SIGNIFICANT CHANGE UP
APPEARANCE UR: CLEAR — SIGNIFICANT CHANGE UP
BARBITURATES UR SCN-MCNC: NEGATIVE — SIGNIFICANT CHANGE UP
BASOPHILS # BLD AUTO: 0.02 K/UL — SIGNIFICANT CHANGE UP (ref 0–0.2)
BASOPHILS # BLD AUTO: 0.04 K/UL — SIGNIFICANT CHANGE UP (ref 0–0.2)
BASOPHILS NFR BLD AUTO: 0.3 % — SIGNIFICANT CHANGE UP (ref 0–1)
BASOPHILS NFR BLD AUTO: 0.5 % — SIGNIFICANT CHANGE UP (ref 0–1)
BENZODIAZ UR-MCNC: NEGATIVE — SIGNIFICANT CHANGE UP
BILIRUB UR-MCNC: NEGATIVE — SIGNIFICANT CHANGE UP
BLD GP AB SCN SERPL QL: SIGNIFICANT CHANGE UP
BUPRENORPHINE SCREEN, URINE RESULT: NEGATIVE — SIGNIFICANT CHANGE UP
COCAINE METAB.OTHER UR-MCNC: NEGATIVE — SIGNIFICANT CHANGE UP
COLOR SPEC: SIGNIFICANT CHANGE UP
DIFF PNL FLD: NEGATIVE — SIGNIFICANT CHANGE UP
DIR ANTIGLOB POLYSPECIFIC INTERPRETATION: SIGNIFICANT CHANGE UP
EOSINOPHIL # BLD AUTO: 0.02 K/UL — SIGNIFICANT CHANGE UP (ref 0–0.7)
EOSINOPHIL # BLD AUTO: 0.06 K/UL — SIGNIFICANT CHANGE UP (ref 0–0.7)
EOSINOPHIL NFR BLD AUTO: 0.3 % — SIGNIFICANT CHANGE UP (ref 0–8)
EOSINOPHIL NFR BLD AUTO: 0.9 % — SIGNIFICANT CHANGE UP (ref 0–8)
FENTANYL UR QL: NEGATIVE — SIGNIFICANT CHANGE UP
FETAL SCREEN: SIGNIFICANT CHANGE UP
GLUCOSE UR QL: NEGATIVE — SIGNIFICANT CHANGE UP
HCT VFR BLD CALC: 31.4 % — LOW (ref 37–47)
HCT VFR BLD CALC: 33.4 % — LOW (ref 37–47)
HGB BLD-MCNC: 10.2 G/DL — LOW (ref 12–16)
HGB BLD-MCNC: 9.5 G/DL — LOW (ref 12–16)
IMM GRANULOCYTES NFR BLD AUTO: 0.4 % — HIGH (ref 0.1–0.3)
IMM GRANULOCYTES NFR BLD AUTO: 0.4 % — HIGH (ref 0.1–0.3)
KETONES UR-MCNC: NEGATIVE — SIGNIFICANT CHANGE UP
L&D DRUG SCREEN, URINE: SIGNIFICANT CHANGE UP
LEUKOCYTE ESTERASE UR-ACNC: NEGATIVE — SIGNIFICANT CHANGE UP
LYMPHOCYTES # BLD AUTO: 1.1 K/UL — LOW (ref 1.2–3.4)
LYMPHOCYTES # BLD AUTO: 1.77 K/UL — SIGNIFICANT CHANGE UP (ref 1.2–3.4)
LYMPHOCYTES # BLD AUTO: 13.8 % — LOW (ref 20.5–51.1)
LYMPHOCYTES # BLD AUTO: 25.4 % — SIGNIFICANT CHANGE UP (ref 20.5–51.1)
MCHC RBC-ENTMCNC: 21.8 PG — LOW (ref 27–31)
MCHC RBC-ENTMCNC: 21.8 PG — LOW (ref 27–31)
MCHC RBC-ENTMCNC: 30.3 G/DL — LOW (ref 32–37)
MCHC RBC-ENTMCNC: 30.5 G/DL — LOW (ref 32–37)
MCV RBC AUTO: 71.4 FL — LOW (ref 81–99)
MCV RBC AUTO: 72.2 FL — LOW (ref 81–99)
METHADONE UR-MCNC: NEGATIVE — SIGNIFICANT CHANGE UP
MONOCYTES # BLD AUTO: 0.44 K/UL — SIGNIFICANT CHANGE UP (ref 0.1–0.6)
MONOCYTES # BLD AUTO: 0.54 K/UL — SIGNIFICANT CHANGE UP (ref 0.1–0.6)
MONOCYTES NFR BLD AUTO: 6.3 % — SIGNIFICANT CHANGE UP (ref 1.7–9.3)
MONOCYTES NFR BLD AUTO: 6.8 % — SIGNIFICANT CHANGE UP (ref 1.7–9.3)
NEUTROPHILS # BLD AUTO: 4.65 K/UL — SIGNIFICANT CHANGE UP (ref 1.4–6.5)
NEUTROPHILS # BLD AUTO: 6.26 K/UL — SIGNIFICANT CHANGE UP (ref 1.4–6.5)
NEUTROPHILS NFR BLD AUTO: 66.7 % — SIGNIFICANT CHANGE UP (ref 42.2–75.2)
NEUTROPHILS NFR BLD AUTO: 78.2 % — HIGH (ref 42.2–75.2)
NITRITE UR-MCNC: NEGATIVE — SIGNIFICANT CHANGE UP
NRBC # BLD: 0 /100 WBCS — SIGNIFICANT CHANGE UP (ref 0–0)
NRBC # BLD: 0 /100 WBCS — SIGNIFICANT CHANGE UP (ref 0–0)
OPIATES UR-MCNC: NEGATIVE — SIGNIFICANT CHANGE UP
OXYCODONE UR-MCNC: NEGATIVE — SIGNIFICANT CHANGE UP
PCP UR-MCNC: NEGATIVE — SIGNIFICANT CHANGE UP
PH UR: 6.5 — SIGNIFICANT CHANGE UP (ref 5–8)
PLATELET # BLD AUTO: 154 K/UL — SIGNIFICANT CHANGE UP (ref 130–400)
PLATELET # BLD AUTO: 162 K/UL — SIGNIFICANT CHANGE UP (ref 130–400)
PRENATAL SYPHILIS TEST: SIGNIFICANT CHANGE UP
PROPOXYPHENE QUALITATIVE URINE RESULT: NEGATIVE — SIGNIFICANT CHANGE UP
PROT UR-MCNC: SIGNIFICANT CHANGE UP
RBC # BLD: 4.35 M/UL — SIGNIFICANT CHANGE UP (ref 4.2–5.4)
RBC # BLD: 4.68 M/UL — SIGNIFICANT CHANGE UP (ref 4.2–5.4)
RBC # FLD: 15.8 % — HIGH (ref 11.5–14.5)
RBC # FLD: 15.9 % — HIGH (ref 11.5–14.5)
SARS-COV-2 RNA SPEC QL NAA+PROBE: SIGNIFICANT CHANGE UP
SP GR SPEC: 1.01 — SIGNIFICANT CHANGE UP (ref 1.01–1.03)
UROBILINOGEN FLD QL: SIGNIFICANT CHANGE UP
WBC # BLD: 6.97 K/UL — SIGNIFICANT CHANGE UP (ref 4.8–10.8)
WBC # BLD: 7.99 K/UL — SIGNIFICANT CHANGE UP (ref 4.8–10.8)
WBC # FLD AUTO: 6.97 K/UL — SIGNIFICANT CHANGE UP (ref 4.8–10.8)
WBC # FLD AUTO: 7.99 K/UL — SIGNIFICANT CHANGE UP (ref 4.8–10.8)

## 2020-12-30 PROCEDURE — 86077 PHYS BLOOD BANK SERV XMATCH: CPT

## 2020-12-30 PROCEDURE — 59409 OBSTETRICAL CARE: CPT | Mod: U9

## 2020-12-30 RX ORDER — SODIUM CHLORIDE 9 MG/ML
3 INJECTION INTRAMUSCULAR; INTRAVENOUS; SUBCUTANEOUS EVERY 8 HOURS
Refills: 0 | Status: DISCONTINUED | OUTPATIENT
Start: 2020-12-30 | End: 2020-12-31

## 2020-12-30 RX ORDER — OXYTOCIN 10 UNIT/ML
333.33 VIAL (ML) INJECTION
Qty: 20 | Refills: 0 | Status: DISCONTINUED | OUTPATIENT
Start: 2020-12-30 | End: 2020-12-31

## 2020-12-30 RX ORDER — DIBUCAINE 1 %
1 OINTMENT (GRAM) RECTAL EVERY 6 HOURS
Refills: 0 | Status: DISCONTINUED | OUTPATIENT
Start: 2020-12-30 | End: 2020-12-31

## 2020-12-30 RX ORDER — SODIUM CHLORIDE 9 MG/ML
1000 INJECTION, SOLUTION INTRAVENOUS
Refills: 0 | Status: DISCONTINUED | OUTPATIENT
Start: 2020-12-30 | End: 2020-12-30

## 2020-12-30 RX ORDER — BENZOCAINE 10 %
1 GEL (GRAM) MUCOUS MEMBRANE EVERY 6 HOURS
Refills: 0 | Status: DISCONTINUED | OUTPATIENT
Start: 2020-12-30 | End: 2020-12-31

## 2020-12-30 RX ORDER — ACETAMINOPHEN 500 MG
975 TABLET ORAL
Refills: 0 | Status: DISCONTINUED | OUTPATIENT
Start: 2020-12-30 | End: 2020-12-31

## 2020-12-30 RX ORDER — OXYCODONE HYDROCHLORIDE 5 MG/1
5 TABLET ORAL
Refills: 0 | Status: DISCONTINUED | OUTPATIENT
Start: 2020-12-30 | End: 2020-12-31

## 2020-12-30 RX ORDER — LANOLIN
1 OINTMENT (GRAM) TOPICAL EVERY 6 HOURS
Refills: 0 | Status: DISCONTINUED | OUTPATIENT
Start: 2020-12-30 | End: 2020-12-31

## 2020-12-30 RX ORDER — PRAMOXINE HYDROCHLORIDE 150 MG/15G
1 AEROSOL, FOAM RECTAL EVERY 4 HOURS
Refills: 0 | Status: DISCONTINUED | OUTPATIENT
Start: 2020-12-30 | End: 2020-12-31

## 2020-12-30 RX ORDER — AER TRAVELER 0.5 G/1
1 SOLUTION RECTAL; TOPICAL EVERY 4 HOURS
Refills: 0 | Status: DISCONTINUED | OUTPATIENT
Start: 2020-12-30 | End: 2020-12-31

## 2020-12-30 RX ORDER — OXYTOCIN 10 UNIT/ML
333.33 VIAL (ML) INJECTION
Qty: 20 | Refills: 0 | Status: DISCONTINUED | OUTPATIENT
Start: 2020-12-30 | End: 2020-12-30

## 2020-12-30 RX ORDER — IBUPROFEN 200 MG
600 TABLET ORAL EVERY 6 HOURS
Refills: 0 | Status: DISCONTINUED | OUTPATIENT
Start: 2020-12-30 | End: 2020-12-31

## 2020-12-30 RX ORDER — IBUPROFEN 200 MG
600 TABLET ORAL EVERY 6 HOURS
Refills: 0 | Status: COMPLETED | OUTPATIENT
Start: 2020-12-30 | End: 2021-11-28

## 2020-12-30 RX ORDER — SIMETHICONE 80 MG/1
80 TABLET, CHEWABLE ORAL EVERY 4 HOURS
Refills: 0 | Status: DISCONTINUED | OUTPATIENT
Start: 2020-12-30 | End: 2020-12-31

## 2020-12-30 RX ORDER — KETOROLAC TROMETHAMINE 30 MG/ML
30 SYRINGE (ML) INJECTION ONCE
Refills: 0 | Status: DISCONTINUED | OUTPATIENT
Start: 2020-12-30 | End: 2020-12-30

## 2020-12-30 RX ORDER — MAGNESIUM HYDROXIDE 400 MG/1
30 TABLET, CHEWABLE ORAL
Refills: 0 | Status: DISCONTINUED | OUTPATIENT
Start: 2020-12-30 | End: 2020-12-31

## 2020-12-30 RX ORDER — OXYCODONE HYDROCHLORIDE 5 MG/1
5 TABLET ORAL ONCE
Refills: 0 | Status: DISCONTINUED | OUTPATIENT
Start: 2020-12-30 | End: 2020-12-31

## 2020-12-30 RX ORDER — HYDROCORTISONE 1 %
1 OINTMENT (GRAM) TOPICAL EVERY 6 HOURS
Refills: 0 | Status: DISCONTINUED | OUTPATIENT
Start: 2020-12-30 | End: 2020-12-31

## 2020-12-30 RX ORDER — ACETAMINOPHEN 500 MG
1000 TABLET ORAL ONCE
Refills: 0 | Status: COMPLETED | OUTPATIENT
Start: 2020-12-30 | End: 2020-12-30

## 2020-12-30 RX ORDER — DIPHENHYDRAMINE HCL 50 MG
25 CAPSULE ORAL EVERY 6 HOURS
Refills: 0 | Status: DISCONTINUED | OUTPATIENT
Start: 2020-12-30 | End: 2020-12-31

## 2020-12-30 RX ADMIN — SODIUM CHLORIDE 125 MILLILITER(S): 9 INJECTION, SOLUTION INTRAVENOUS at 02:00

## 2020-12-30 RX ADMIN — Medication 30 MILLIGRAM(S): at 02:51

## 2020-12-30 RX ADMIN — Medication 975 MILLIGRAM(S): at 21:20

## 2020-12-30 RX ADMIN — SODIUM CHLORIDE 3 MILLILITER(S): 9 INJECTION INTRAMUSCULAR; INTRAVENOUS; SUBCUTANEOUS at 12:18

## 2020-12-30 RX ADMIN — Medication 600 MILLIGRAM(S): at 18:15

## 2020-12-30 RX ADMIN — Medication 975 MILLIGRAM(S): at 15:17

## 2020-12-30 RX ADMIN — Medication 975 MILLIGRAM(S): at 09:26

## 2020-12-30 RX ADMIN — Medication 600 MILLIGRAM(S): at 23:45

## 2020-12-30 RX ADMIN — Medication 600 MILLIGRAM(S): at 19:03

## 2020-12-30 RX ADMIN — Medication 975 MILLIGRAM(S): at 10:16

## 2020-12-30 RX ADMIN — Medication 400 MILLIGRAM(S): at 03:30

## 2020-12-30 RX ADMIN — Medication 30 MILLIGRAM(S): at 02:36

## 2020-12-30 RX ADMIN — SODIUM CHLORIDE 3 MILLILITER(S): 9 INJECTION INTRAMUSCULAR; INTRAVENOUS; SUBCUTANEOUS at 20:17

## 2020-12-30 RX ADMIN — Medication 1 TABLET(S): at 13:38

## 2020-12-30 RX ADMIN — Medication 975 MILLIGRAM(S): at 20:17

## 2020-12-30 NOTE — OB PROVIDER H&P - NSHPLABSRESULTS_GEN_ALL_CORE
Labs:  HbSAg: neg  HIV: neg   RPR: neg   Blood Type: AB neg   Antibody Screen: positive for anti-D  Rubella: immune  Varicella: immune  Measles: immune  GCT:   GBS: neg       [place]os:  @24w3d: AGA 18%, anterior placenta, afv nml, cvx-32mm, normal anatomy seen Labs:  HbSAg: neg  HIV: neg   RPR: neg   Blood Type: AB neg   Antibody Screen: positive for anti-D  Rubella: immune  Varicella: immune  Measles: immune  GBS: neg       [place]:  @24w3d: AGA 18%, anterior placenta, afv nml, cvx-32mm, normal anatomy seen

## 2020-12-30 NOTE — OB PROVIDER H&P - ASSESSMENT
A/P: 26yo  at 40w0d GA, Rh negative s/p RhoGAM (10/26); in labor   -admit to labor and delivery  -pain management prn   -continous efm & toco  -admission labs  -IV access   -IV hydration   -diet: clear liquid diet   -COVID-19 swab       Dr. Gee and Dr. Christensen aware A/P: 26yo  at 40w0d GA, Rh negative s/p RhoGAM (10/26); in early labor   -admit to labor and delivery  -pain management prn   -continous efm & toco  -admission labs  -IV access   -IV hydration   -diet: clear liquid diet   -COVID-19 swab       Dr. Gee and Dr. Christensen aware

## 2020-12-30 NOTE — CHART NOTE - NSCHARTNOTEFT_GEN_A_CORE
PGY-2 Note:  Pt seen and examined at bedside during low FHR baseline. Pt reporting contractions every 2 minutes and rates her pain 9/10 in intensity and requesting epidural.   Maternal supplemental oxygen administered. 1L bolus of LR administered.     Vital Signs Last 24 Hrs  T(F): 99 (30 Dec 2020 00:37), Max: 99 (30 Dec 2020 00:21)  HR: 89 (30 Dec 2020 01:33) (74 - 89)  BP: 123/73 (30 Dec 2020 01:33) (116/74 - 123/73)  RR: 16 (30 Dec 2020 00:37) (16 - 16)    EFM: 105bpm/moderate variability/+accels   [place]: q2mins  SVE: 4/90/-1, AROM light meconium; ISE placed      A/P: 24yo  at 40w0d GA, Rh negative s/p RhoGAM (10/26); in early labor with low FHR baseline   -continous efm & toco  -f/u admission labs  -continue IVF hydration   -diet: clear liquid diet   -anesthesia notified of patient's desire for an epidural   -COVID-19 swab       Dr. Gee aware. Dr. Christensen at bedside

## 2020-12-30 NOTE — OB PROVIDER H&P - HISTORY OF PRESENT ILLNESS
24yo  at 40w0d GA dated by LMP presents to L&D for evaluation of contractions that began at 2200. Pt states that since the onset of her contractions they have increased in intensity and frequency. Pt states that her contractions are occurring every 5 minutes and rates her pain 8/10 in intensity. Desires pain management. Reports good fetal movement. Denies VB or LOF. GBS negative. Rh negative s/p RhoGAM on 10/26

## 2020-12-30 NOTE — OB PROVIDER H&P - NS_OBGYNHISTORY_OBGYN_ALL_OB_FT
OB Hx: ) 10/10/2014, FT , 6-0  G2) MAB with D&C  G3) ,  , 6-0  G4) MAB with D&C  G5) 2019, FT , 6-13  G6) MAB with D&C  G7) current

## 2020-12-30 NOTE — OB PROVIDER DELIVERY SUMMARY - NSPROVIDERDELIVERYNOTE_OBGYN_ALL_OB_FT
Patient was fully dilated and pushing. Fetal head was OA and restituted to ROT. The anterior and posterior shoulders delivered, followed by the remaining body atraumatically. Delayed cord clamping was performed, and then clamped and cut. Cord blood gases collected x2. The  was handed to the mother. The placenta delivered intact with membranes. Pitocin was administered. Uterus massaged, fundus found to be firm. Cervix, vagina and perineum inspected. No laceration noted with good hemostasis.     Viable female infant delivered, with APGARs 9/9  Lacteration: none  EBL 300cc     present for the delivery

## 2020-12-30 NOTE — OB PROVIDER H&P - NSHPPHYSICALEXAM_GEN_ALL_CORE
Vital Signs Last 24 Hrs  T(F): 99 (30 Dec 2020 00:37), Max: 99 (30 Dec 2020 00:21)  HR: 74 (30 Dec 2020 00:37) (74 - 74)  BP: 116/74 (30 Dec 2020 00:37) (116/74 - 116/74)  RR: 16 (30 Dec 2020 00:37) (16 - 16)    Physical Exam:  GA: AOX3, no apparent distress  Resp: CTAB  Cardio: RRR  Abd: soft, nontender, no palpable ctx, gravid  Extr: no erythema or pain to palpation bilaterally   SVE: 4/80/-1, vtx, intact per Dr. Hall    EFM: 110bpm/moderate variability/+accels   [place]: q2-4mins

## 2020-12-30 NOTE — OB PROVIDER H&P - PRO RUBELLA INFANT
"This message is for patient in regards to his/her appointment 9/29/20 at 1:40pm      [facility] Policy: For the safety of all patients and staff members.     Patient Visitor policy: Due to social distancing and limited seating staff are requesting patient to arrive to their schedule appointments alone. If patient do not need assistance with their visit, we're asking all visitors to remain outside the waiting area.    Upon arriving to facility; patient will have temperature taking and are required to wear a face mask, if patient do not have a face mask one will be provided. Upon arriving patient we ask patients to contact clinic at this number [phone] to notify staff that they have arrived or they may do do by utilizing the Mobile checked in [place](if patient have patient portal; clinical staff will send a message through there letting them know it's okay to proceed to their visit). Staff will give the patient the "okay" to come up or wait inside their vehicle until clinic is ready for patient to be seen by  Buffy gee Np If you have any questions or concerns please contact [phone]        "
immune

## 2020-12-31 ENCOUNTER — TRANSCRIPTION ENCOUNTER (OUTPATIENT)
Age: 25
End: 2020-12-31

## 2020-12-31 VITALS
HEART RATE: 58 BPM | RESPIRATION RATE: 18 BRPM | TEMPERATURE: 97 F | DIASTOLIC BLOOD PRESSURE: 60 MMHG | SYSTOLIC BLOOD PRESSURE: 110 MMHG

## 2020-12-31 LAB
SARS-COV-2 IGG SERPL QL IA: NEGATIVE — SIGNIFICANT CHANGE UP
SARS-COV-2 IGM SERPL IA-ACNC: 0.07 INDEX — SIGNIFICANT CHANGE UP

## 2020-12-31 RX ORDER — ACETAMINOPHEN 500 MG
3 TABLET ORAL
Qty: 0 | Refills: 0 | DISCHARGE
Start: 2020-12-31

## 2020-12-31 RX ORDER — IBUPROFEN 200 MG
1 TABLET ORAL
Qty: 0 | Refills: 0 | DISCHARGE
Start: 2020-12-31

## 2020-12-31 RX ADMIN — Medication 975 MILLIGRAM(S): at 14:36

## 2020-12-31 RX ADMIN — Medication 975 MILLIGRAM(S): at 09:00

## 2020-12-31 RX ADMIN — Medication 600 MILLIGRAM(S): at 11:41

## 2020-12-31 RX ADMIN — SODIUM CHLORIDE 3 MILLILITER(S): 9 INJECTION INTRAMUSCULAR; INTRAVENOUS; SUBCUTANEOUS at 14:30

## 2020-12-31 RX ADMIN — Medication 600 MILLIGRAM(S): at 00:15

## 2020-12-31 RX ADMIN — Medication 1 TABLET(S): at 11:11

## 2020-12-31 RX ADMIN — Medication 600 MILLIGRAM(S): at 11:11

## 2020-12-31 RX ADMIN — Medication 975 MILLIGRAM(S): at 09:32

## 2020-12-31 NOTE — DISCHARGE NOTE OB - DISCHARGE DATE
31-Dec-2020 Quality 474: Zoster Vaccination Status: Shingrix Vaccination Administered or Previously Received Additional Notes: Patient states she was given the shingles vaccine about 8 years ago\\nPatient states on a scale 0-10 they report a pain level of 3 Quality 130: Documentation Of Current Medications In The Medical Record: Current Medications Documented Detail Level: Detailed Quality 131: Pain Assessment And Follow-Up: Pain assessment documented as positive using a standardized tool AND a follow-up plan is documented

## 2020-12-31 NOTE — DISCHARGE NOTE OB - PATIENT PORTAL LINK FT
You can access the FollowMyHealth Patient Portal offered by [facility] by registering at the following website: [email or web address]. By joining [employer]’s FollowMyHealth portal, you will also be able to view your health information using other applications (apps) compatible with our system.

## 2020-12-31 NOTE — PROGRESS NOTE ADULT - SUBJECTIVE AND OBJECTIVE BOX
OB attending  PPD #2    Pt doing well, pain well controlled. No overnight events, no acute complaints.    Ambulating: Yes  Voiding: Yes  Flatus: Yes  Bowel movements: Yes   Breast or bottle feeding: Breastfeeding  Diet: Regular    PAST MEDICAL & SURGICAL HISTORY:  No pertinent past medical history    History of D&amp;C  X3 for MABX3        Physical Exam  Vital Signs Last 24 Hrs  T(C): 36.3 (30 Dec 2020 23:45), Max: 36.4 (30 Dec 2020 15:15)  T(F): 97.3 (30 Dec 2020 23:45), Max: 97.6 (30 Dec 2020 15:15)  HR: 55 (30 Dec 2020 23:45) (55 - 63)  BP: 114/66 (30 Dec 2020 23:45) (110/69 - 120/70)  BP(mean): --  RR: 18 (30 Dec 2020 23:45) (18 - 18)  SpO2: --  Gen: AAOx3, NAD  Abd: Soft, nontender, nondistended, BS+  Fundus: Firm, below umbilicus  Lochia: normal  Ext: No calf tenderness, no swelling    Labs:                        9.5    7.99  )-----------( 154      ( 30 Dec 2020 11:54 )             31.4         A/P: , s/p , PPD #2, doing well  - continue current management  d/c home

## 2020-12-31 NOTE — DISCHARGE NOTE OB - HOSPITAL COURSE
12-31-20 @ 08:18    HPI:  26yo  at 40w0d GA dated by LMP presents to L&D for evaluation of contractions that began at 2200. Pt states that since the onset of her contractions they have increased in intensity and frequency. Pt states that her contractions are occurring every 5 minutes and rates her pain 8/10 in intensity. Desires pain management. Reports good fetal movement. Denies VB or LOF. GBS negative. Rh negative s/p RhoGAM on 10/26 (30 Dec 2020 00:40)      PAST MEDICAL & SURGICAL HISTORY:  No pertinent past medical history    History of D&amp;C  X3 for MABX3        POST PARTUM COURSE:   uncomplicated       LABS:                        9.5    7.99  )-----------( 154      ( 30 Dec 2020 11:54 )             31.4                         10.2   6.97  )-----------( 162      ( 30 Dec 2020 00:40 )             33.4                   Allergies    No Known Allergies    Intolerances

## 2021-01-04 DIAGNOSIS — Z3A.40 40 WEEKS GESTATION OF PREGNANCY: ICD-10-CM

## 2021-01-04 DIAGNOSIS — O26.893 OTHER SPECIFIED PREGNANCY RELATED CONDITIONS, THIRD TRIMESTER: ICD-10-CM

## 2021-05-24 NOTE — ASU PATIENT PROFILE, ADULT - BLOOD TRANSFUSION, PREVIOUS, PROFILE
no PROBLEM DIAGNOSES  Problem: Unilateral primary osteoarthritis, left hip  Assessment and Plan: Left total hip arthroplasty  labs - cbc,pt/ptt,bmp,t&s,nose cx,ekg  M/C required  preop 3 day hibiclens instruction reviewed and given .instructed on if  nose cx positive use mupuricin 5 days and checklist given  take routine meds DOS with sips of water. avoid NSAID and OTC supplements. verbalized understanding  information on proper nutrition , increase protein and better food choices provided in packet   Ensure clear given    Problem: HLD (hyperlipidemia)  Assessment and Plan: Continue current regimen and medications.     Problem: History of BPH  Assessment and Plan: Continue current regimen and medications.

## 2021-12-09 ENCOUNTER — APPOINTMENT (OUTPATIENT)
Dept: ANTEPARTUM | Facility: CLINIC | Age: 26
End: 2021-12-09
Payer: MEDICAID

## 2021-12-09 ENCOUNTER — ASOB RESULT (OUTPATIENT)
Age: 26
End: 2021-12-09

## 2021-12-09 PROCEDURE — 76801 OB US < 14 WKS SINGLE FETUS: CPT

## 2021-12-09 PROCEDURE — 76813 OB US NUCHAL MEAS 1 GEST: CPT | Mod: 59

## 2022-02-03 ENCOUNTER — APPOINTMENT (OUTPATIENT)
Dept: ANTEPARTUM | Facility: CLINIC | Age: 27
End: 2022-02-03

## 2022-03-08 ENCOUNTER — APPOINTMENT (OUTPATIENT)
Dept: ANTEPARTUM | Facility: CLINIC | Age: 27
End: 2022-03-08
Payer: MEDICAID

## 2022-03-08 ENCOUNTER — ASOB RESULT (OUTPATIENT)
Age: 27
End: 2022-03-08

## 2022-03-08 PROCEDURE — 76811 OB US DETAILED SNGL FETUS: CPT

## 2022-04-19 NOTE — OB PROVIDER IHI INDUCTION/AUGMENTATION NOTE - LABOR: CERVICAL DILATION
Nutrition Services Progress Note     Referral Diagnosis:    Body mass index (BMI) of 31.0-31.9 in adult [Z68.31]     Start Time:  1500  End Time:   1515     Food and Nutrition Related History:  Oral fluids:  oz water/Vitamin water zero     Meal/Snack pattern:  3 meals/day, minimal snacks     Types of foods/meals:  Patient reports decreased sweets-down to 2x/week.  She has increased her fruit intake to 1-2x/day and struggles consuming vegetables.     24 hour food recall: Breakfast: cheerios, toast and milk.  Lunch: hamburger-no bun, cottage cheese, green beans, milk.  Supper: chicken, mashed potatoes, green beans, orange.  HS snack: fruit     Medications, specify prescription or OTC: reivewed      Knowledge: Pt has basic knowledge of dietary guidelines for weight loss     Participation in government programs: uses food pantries 1-2x/week. Denies any concerns receiving enough food in her household. On the food share program.      Type of physical activity: physically active job 3x/week for 6 hours     Anthropometric Measurements:  Estimated body mass index is 28.25 kg/m² as calculated from the following:    Height as of this encounter: 5' 4\" (1.626 m).    Weight as of this encounter: 74.7 kg   Weight change: 2.4 kg weight gain x 1 month-patient reports bilateral swelling to lower extremities     Biochemical Data, Medical Tests, and Procedures:  Labs reviewed     Nutrition-Focused Physical Findings:  Overall appearance: BMI >25  Digestive system (mouth to rectum): edentulous   Extremities, muscles and bones: bilateral leg swelling      Client History:        History - past medical             Past Medical History:   Diagnosis Date   • Anxiety and depression     • Arthritis     • Chronic back pain     • COPD (chronic obstructive pulmonary disease) (CMS/[facility])       saw Dr. Oliver through [facility] 10/13/16, recommended f/u 1 year   • CSA (central sleep apnea) 1/7/2016   • Essential (primary) hypertension     • Facial  paralysis/[place] palsy     • Fibromyalgia     • GERD (gastroesophageal reflux disease)     • History of adenomatous polyp of colon     • Incontinence     • Leg edema     • BECCA (obstructive sleep apnea) 1/7/2016     no machine    • Osteopenia       bone density 4/29/16   • Other and unspecified hyperlipidemia     • Restless legs syndrome (RLS)     • Tobacco use            Comparative Standards:   Estimated energy needs -  Total energy estimated needs (CS-1.1.1): 5095-8329 kcal (20-22 kcal/kg), 58-73 g protein (0.8-1 g/kg)      Nutrition Diagnosis:  Overweight/obesity related to history of decreased activity and excessive calorie intakes as evidenced by BMI.     Nutrition Prescription:  Regular Diet  Following My Plate Method     Intervention:  Purpose of the nutrition education:  Reviewed the my plate and importance of eating 3 meals/day with all food groups.  Discussed increasing fruit and vegetables in the diet.    Personal Goals:  1. Have a vegetable 5x/week.  2. Call your doctor about your leg swelling.  3. Have a good protein with breakfast (eggs, string cheese, greek yogurt, light yogurt)          The instruction was given to the patient.  The barriers to self-care and learning limitations were assessed as none  Preferences for learning: No preference     Patient has a basic understanding following the plate method/low sodium diet after education  Learning Topics: Rationale for Diet and Guidelines for Diet   Handouts given: AVS     Monitoring and Evaluation:  Area(s) and level of knowledge:  Goal: Pt verbalize understanding of diet education.   Achieved-pt verbalized basic understanding     Weight change: Goal: 0.45 to 0.9 kg of wt loss per week. Not achieved.     Follow up in 1 month.      2-3.9 cm

## 2022-06-20 VITALS — DIASTOLIC BLOOD PRESSURE: 72 MMHG | SYSTOLIC BLOOD PRESSURE: 107 MMHG | WEIGHT: 127 LBS

## 2022-06-22 ENCOUNTER — OUTPATIENT (OUTPATIENT)
Dept: OUTPATIENT SERVICES | Facility: HOSPITAL | Age: 27
LOS: 1 days | Discharge: HOME | End: 2022-06-22
Payer: MEDICAID

## 2022-06-22 DIAGNOSIS — Z98.890 OTHER SPECIFIED POSTPROCEDURAL STATES: Chronic | ICD-10-CM

## 2022-06-23 VITALS
HEART RATE: 67 BPM | SYSTOLIC BLOOD PRESSURE: 119 MMHG | TEMPERATURE: 98 F | DIASTOLIC BLOOD PRESSURE: 73 MMHG | RESPIRATION RATE: 16 BRPM

## 2022-06-23 PROCEDURE — 99213 OFFICE O/P EST LOW 20 MIN: CPT | Mod: 25

## 2022-06-23 PROCEDURE — 76818 FETAL BIOPHYS PROFILE W/NST: CPT | Mod: 26

## 2022-06-23 NOTE — OB PROVIDER TRIAGE NOTE - NS_OBGYNHISTORY_OBGYN_ALL_OB_FT
OB Hx:     x4  Largest baby ~2900gm    GYN Hx:  Pt. denies cysts, fibroids, abnormal pap, STI  Hx D&C X2, 2018, 2019  Hx SAB x1 (positive pregnancy test followed by period 2 weeks later. OB Hx:     x4  Largest baby ~2900gm    GYN Hx:  Pt. denies cysts, fibroids, abnormal pap, STI  Hx D&C X2, 2018, 2019  Hx SAB x1 (positive pregnancy test followed by period 2 weeks later) OB Hx:    FT  x4  Largest baby ~2900gm  SAB x2, with D&C x2    GYN Hx:  Pt. denies cysts, fibroids, abnormal pap, STI

## 2022-06-23 NOTE — OB PROVIDER TRIAGE NOTE - NSHPLABSRESULTS_GEN_ALL_CORE
Labs:  11/17/2021  Measles immune  mumps immune  rubella immune  varicella immune  AB Neg, antibody Pos  HIV NR  HBSA NR  RPR NR    5/31/2022  GBS neg  HIV NR  RPR NR    Sono:  12/9/2021 - GA 12w2d    03/08/2022 - GA 24w5d, ant placenta, 48%, normal anatomy Labs:  11/17/2021  Measles immune  mumps immune  rubella immune  varicella immune  AB Neg, antibody neg  HIV NR  HBsAg NR  RPR NR    5/31/2022  GBS neg  HIV NR  RPR NR    Sono:  12/9/2021 - GA 12w2d    03/08/2022 - GA 24w5d, ant placenta, EFW 746g 48%, normal anatomy

## 2022-06-23 NOTE — OB PROVIDER TRIAGE NOTE - ATTENDING COMMENTS
25 yo [id number] @40w0d, Rh neg, GBS neg, in early labor, BPP 10/10, NST reactive, maternal and fetal wellbeing reassuring.    EFM: cat 1  [place]: irreg ctx    Plan  - discharge home  - routien f/u  - biweekly nst/bpp  - labor preacuiotns    total time spnet on e/m 20 min

## 2022-06-23 NOTE — OB PROVIDER TRIAGE NOTE - HISTORY OF PRESENT ILLNESS
25 yo  @40w0d complains of contractions since 7pm this evening. Patient reports contractions occur every 10 minutes, 6/10 intensity. Reports good fetal movement, denies LOF or VB. Patient reports not complications with this pregnancy. GBS negative. Rh antibody positive, received first does of Rhogam.   27 yo  @40w0d by LMP confirmed by 1st trimester sonogram, complains of contractions since 7pm this evening. Patient reports contractions occur every 10 minutes, 6/10 intensity. Reports good fetal movement, denies LOF or VB. Patient reports not complications with this pregnancy. GBS negative. Rh antibody positive, received first does of Rhogam 2022   27 yo  @40w0d by 1st trimester sonogram, presenting to L&D for contractions since 7pm. Patient reports contractions occur every 10 minutes, 6/10 intensity. Reports good fetal movement, denies LOF or VB. Patient reports no complications with this pregnancy. Rh neg, received first does of Rhogam 2022. GBS negative.

## 2022-06-23 NOTE — OB RN TRIAGE NOTE - FALL HARM RISK - UNIVERSAL INTERVENTIONS
Bed in lowest position, wheels locked, appropriate side rails in place/Call bell, personal items and telephone in reach/Instruct patient to call for assistance before getting out of bed or chair/Non-slip footwear when patient is out of bed/[place] to call system/Physically safe environment - no spills, clutter or unnecessary equipment/Purposeful Proactive Rounding/Room/bathroom lighting operational, light cord in reach

## 2022-06-23 NOTE — OB PROVIDER TRIAGE NOTE - NSOBPROVIDERNOTE_OBGYN_ALL_OB_FT
27 yo [id number] @40w0d, Rh neg, GBS neg, early labor. Stable.  -discharge  -labor precautions  -encourage PO hydration  -patient instructed to follow-up with Dr. Hayes tomorrow/Monday if desiring IOL. 27 yo [id number] @40w0d, Rh neg, GBS neg, early labor. Stable.  -discharge home  -labor precautions discussed  -encourage PO hydration  -pain management with tylenol PRN, hot showers, heat packs  -patient instructed to follow-up with Dr. Hayes tomorrow/Monday if desiring IOL.     Dr. Jaimes and Dr. Johnson aware 27 yo [id number] @40w0d, Rh neg, GBS neg, in early labor, BPP 10/10, NST reactive, maternal and fetal wellbeing reassuring.    -discharge home  -labor precautions discussed  -encourage PO hydration  -pain management with tylenol PRN, hot showers, heat packs  -patient instructed to follow-up with Dr. Hayes tomorrow/Monday if desiring IOL.     Dr. Jaimes and Dr. Johnson aware

## 2022-06-23 NOTE — OB PROVIDER TRIAGE NOTE - NSHPPHYSICALEXAM_GEN_ALL_CORE
Vital Signs Last 24 Hrs  T(C): 36.8 (23 Jun 2022 00:24), Max: 36.8 (23 Jun 2022 00:24)  T(F): 98.2 (23 Jun 2022 00:24), Max: 98.2 (23 Jun 2022 00:24)  HR: 67 (23 Jun 2022 00:24) (67 - 67)  BP: 119/73 (23 Jun 2022 00:24) (119/73 - 119/73)  RR: 16 (23 Jun 2022 00:24) (16 - 16)    Gen: NAD, sitting comfortably  Abd: Gravid, soft, NT, palpable ctx  SVE: ,vtx, intact  EFM: /mod/+accels  [place]: Q qasim  Sono: Vital Signs Last 24 Hrs  T(C): 36.8 (23 Jun 2022 00:24), Max: 36.8 (23 Jun 2022 00:24)  T(F): 98.2 (23 Jun 2022 00:24), Max: 98.2 (23 Jun 2022 00:24)  HR: 67 (23 Jun 2022 00:24) (67 - 67)  BP: 119/73 (23 Jun 2022 00:24) (119/73 - 119/73)  RR: 16 (23 Jun 2022 00:24) (16 - 16)    Gen: NAD, sitting comfortably  Abd: Gravid, soft, NT, palpable ctx  SVE: ,vtx, intact  EFM: 110/mod/+accels/no decels - reactive   [place]: Q10-11m  Sono: cephalic, BPP 10/10, MVP 3.13 Vital Signs Last 24 Hrs  T(C): 36.8 (23 Jun 2022 00:24), Max: 36.8 (23 Jun 2022 00:24)  T(F): 98.2 (23 Jun 2022 00:24), Max: 98.2 (23 Jun 2022 00:24)  HR: 67 (23 Jun 2022 00:24) (67 - 67)  BP: 119/73 (23 Jun 2022 00:24) (119/73 - 119/73)  RR: 16 (23 Jun 2022 00:24) (16 - 16)    Gen: NAD, sitting comfortably  Abd: Gravid, soft, NT, moderately palpable ctx  SVE: 2/50/-2, vtx, intact  EFM: 110/mod/+accels/no decels - reactive   [place]: Q10-11m  Sono: cephalic, ant placenta, BPP 10/10, MVP 3.13

## 2022-06-25 ENCOUNTER — INPATIENT (INPATIENT)
Facility: HOSPITAL | Age: 27
LOS: 1 days | Discharge: HOME | End: 2022-06-27
Attending: OBSTETRICS & GYNECOLOGY | Admitting: OBSTETRICS & GYNECOLOGY
Payer: MEDICAID

## 2022-06-25 VITALS
WEIGHT: 130.07 LBS | HEIGHT: 61.02 IN | SYSTOLIC BLOOD PRESSURE: 124 MMHG | DIASTOLIC BLOOD PRESSURE: 79 MMHG | TEMPERATURE: 99 F | RESPIRATION RATE: 18 BRPM | HEART RATE: 86 BPM

## 2022-06-25 DIAGNOSIS — Z98.890 OTHER SPECIFIED POSTPROCEDURAL STATES: Chronic | ICD-10-CM

## 2022-06-25 LAB
ALLERGY+IMMUNOLOGY DIAG STUDY NOTE: SIGNIFICANT CHANGE UP
AMPHET UR-MCNC: NEGATIVE — SIGNIFICANT CHANGE UP
APPEARANCE UR: ABNORMAL
APTT BLD: 23.8 SEC — CRITICAL LOW (ref 27–39.2)
BACTERIA # UR AUTO: ABNORMAL
BARBITURATES UR SCN-MCNC: NEGATIVE — SIGNIFICANT CHANGE UP
BASOPHILS # BLD AUTO: 0.03 K/UL — SIGNIFICANT CHANGE UP (ref 0–0.2)
BASOPHILS # BLD AUTO: 0.04 K/UL — SIGNIFICANT CHANGE UP (ref 0–0.2)
BASOPHILS NFR BLD AUTO: 0.4 % — SIGNIFICANT CHANGE UP (ref 0–1)
BASOPHILS NFR BLD AUTO: 0.4 % — SIGNIFICANT CHANGE UP (ref 0–1)
BENZODIAZ UR-MCNC: NEGATIVE — SIGNIFICANT CHANGE UP
BILIRUB UR-MCNC: NEGATIVE — SIGNIFICANT CHANGE UP
BLD GP AB SCN SERPL QL: SIGNIFICANT CHANGE UP
BUPRENORPHINE SCREEN, URINE RESULT: NEGATIVE — SIGNIFICANT CHANGE UP
COCAINE METAB.OTHER UR-MCNC: NEGATIVE — SIGNIFICANT CHANGE UP
COLOR SPEC: YELLOW — SIGNIFICANT CHANGE UP
DIFF PNL FLD: NEGATIVE — SIGNIFICANT CHANGE UP
DIR ANTIGLOB POLYSPECIFIC INTERPRETATION: SIGNIFICANT CHANGE UP
EOSINOPHIL # BLD AUTO: 0.01 K/UL — SIGNIFICANT CHANGE UP (ref 0–0.7)
EOSINOPHIL # BLD AUTO: 0.03 K/UL — SIGNIFICANT CHANGE UP (ref 0–0.7)
EOSINOPHIL NFR BLD AUTO: 0.1 % — SIGNIFICANT CHANGE UP (ref 0–8)
EOSINOPHIL NFR BLD AUTO: 0.4 % — SIGNIFICANT CHANGE UP (ref 0–8)
EPI CELLS # UR: 6 /HPF — HIGH (ref 0–5)
FENTANYL UR QL: NEGATIVE — SIGNIFICANT CHANGE UP
FIBRINOGEN PPP-MCNC: 605 MG/DL — HIGH (ref 204.4–570.6)
GLUCOSE UR QL: NEGATIVE — SIGNIFICANT CHANGE UP
HCT VFR BLD CALC: 28.7 % — LOW (ref 37–47)
HCT VFR BLD CALC: 33.3 % — LOW (ref 37–47)
HGB BLD-MCNC: 10.5 G/DL — LOW (ref 12–16)
HGB BLD-MCNC: 9.2 G/DL — LOW (ref 12–16)
HYALINE CASTS # UR AUTO: 23 /LPF — HIGH (ref 0–7)
IMM GRANULOCYTES NFR BLD AUTO: 0.3 % — SIGNIFICANT CHANGE UP (ref 0.1–0.3)
IMM GRANULOCYTES NFR BLD AUTO: 0.4 % — HIGH (ref 0.1–0.3)
INR BLD: 0.96 RATIO — SIGNIFICANT CHANGE UP (ref 0.65–1.3)
KETONES UR-MCNC: NEGATIVE — SIGNIFICANT CHANGE UP
L&D DRUG SCREEN, URINE: SIGNIFICANT CHANGE UP
LEUKOCYTE ESTERASE UR-ACNC: ABNORMAL
LYMPHOCYTES # BLD AUTO: 1.25 K/UL — SIGNIFICANT CHANGE UP (ref 1.2–3.4)
LYMPHOCYTES # BLD AUTO: 1.49 K/UL — SIGNIFICANT CHANGE UP (ref 1.2–3.4)
LYMPHOCYTES # BLD AUTO: 11.9 % — LOW (ref 20.5–51.1)
LYMPHOCYTES # BLD AUTO: 19.3 % — LOW (ref 20.5–51.1)
MCHC RBC-ENTMCNC: 21.7 PG — LOW (ref 27–31)
MCHC RBC-ENTMCNC: 22 PG — LOW (ref 27–31)
MCHC RBC-ENTMCNC: 31.5 G/DL — LOW (ref 32–37)
MCHC RBC-ENTMCNC: 32.1 G/DL — SIGNIFICANT CHANGE UP (ref 32–37)
MCV RBC AUTO: 68.7 FL — LOW (ref 81–99)
MCV RBC AUTO: 68.9 FL — LOW (ref 81–99)
METHADONE UR-MCNC: NEGATIVE — SIGNIFICANT CHANGE UP
MONOCYTES # BLD AUTO: 0.41 K/UL — SIGNIFICANT CHANGE UP (ref 0.1–0.6)
MONOCYTES # BLD AUTO: 0.47 K/UL — SIGNIFICANT CHANGE UP (ref 0.1–0.6)
MONOCYTES NFR BLD AUTO: 4.5 % — SIGNIFICANT CHANGE UP (ref 1.7–9.3)
MONOCYTES NFR BLD AUTO: 5.3 % — SIGNIFICANT CHANGE UP (ref 1.7–9.3)
NEUTROPHILS # BLD AUTO: 5.76 K/UL — SIGNIFICANT CHANGE UP (ref 1.4–6.5)
NEUTROPHILS # BLD AUTO: 8.73 K/UL — HIGH (ref 1.4–6.5)
NEUTROPHILS NFR BLD AUTO: 74.3 % — SIGNIFICANT CHANGE UP (ref 42.2–75.2)
NEUTROPHILS NFR BLD AUTO: 82.7 % — HIGH (ref 42.2–75.2)
NITRITE UR-MCNC: NEGATIVE — SIGNIFICANT CHANGE UP
NRBC # BLD: 0 /100 WBCS — SIGNIFICANT CHANGE UP (ref 0–0)
NRBC # BLD: 0 /100 WBCS — SIGNIFICANT CHANGE UP (ref 0–0)
OPIATES UR-MCNC: NEGATIVE — SIGNIFICANT CHANGE UP
OXYCODONE UR-MCNC: NEGATIVE — SIGNIFICANT CHANGE UP
PCP UR-MCNC: NEGATIVE — SIGNIFICANT CHANGE UP
PH UR: 6.5 — SIGNIFICANT CHANGE UP (ref 5–8)
PLATELET # BLD AUTO: 201 K/UL — SIGNIFICANT CHANGE UP (ref 130–400)
PLATELET # BLD AUTO: 207 K/UL — SIGNIFICANT CHANGE UP (ref 130–400)
PRENATAL SYPHILIS TEST: SIGNIFICANT CHANGE UP
PROPOXYPHENE QUALITATIVE URINE RESULT: NEGATIVE — SIGNIFICANT CHANGE UP
PROT UR-MCNC: ABNORMAL
PROTHROM AB SERPL-ACNC: 11 SEC — SIGNIFICANT CHANGE UP (ref 9.95–12.87)
RBC # BLD: 4.18 M/UL — LOW (ref 4.2–5.4)
RBC # BLD: 4.83 M/UL — SIGNIFICANT CHANGE UP (ref 4.2–5.4)
RBC # FLD: 17.4 % — HIGH (ref 11.5–14.5)
RBC # FLD: 17.4 % — HIGH (ref 11.5–14.5)
RBC CASTS # UR COMP ASSIST: 0 /HPF — SIGNIFICANT CHANGE UP (ref 0–4)
SARS-COV-2 RNA SPEC QL NAA+PROBE: SIGNIFICANT CHANGE UP
SP GR SPEC: 1.02 — SIGNIFICANT CHANGE UP (ref 1.01–1.03)
UROBILINOGEN FLD QL: SIGNIFICANT CHANGE UP
WBC # BLD: 10.54 K/UL — SIGNIFICANT CHANGE UP (ref 4.8–10.8)
WBC # BLD: 7.74 K/UL — SIGNIFICANT CHANGE UP (ref 4.8–10.8)
WBC # FLD AUTO: 10.54 K/UL — SIGNIFICANT CHANGE UP (ref 4.8–10.8)
WBC # FLD AUTO: 7.74 K/UL — SIGNIFICANT CHANGE UP (ref 4.8–10.8)
WBC UR QL: 83 /HPF — HIGH (ref 0–5)

## 2022-06-25 PROCEDURE — 59410 OBSTETRICAL CARE: CPT | Mod: U9

## 2022-06-25 PROCEDURE — 86077 PHYS BLOOD BANK SERV XMATCH: CPT

## 2022-06-25 RX ORDER — DIPHENHYDRAMINE HCL 50 MG
25 CAPSULE ORAL EVERY 6 HOURS
Refills: 0 | Status: DISCONTINUED | OUTPATIENT
Start: 2022-06-25 | End: 2022-06-27

## 2022-06-25 RX ORDER — SODIUM CHLORIDE 9 MG/ML
3 INJECTION INTRAMUSCULAR; INTRAVENOUS; SUBCUTANEOUS EVERY 8 HOURS
Refills: 0 | Status: DISCONTINUED | OUTPATIENT
Start: 2022-06-25 | End: 2022-06-27

## 2022-06-25 RX ORDER — ACETAMINOPHEN 500 MG
975 TABLET ORAL
Refills: 0 | Status: DISCONTINUED | OUTPATIENT
Start: 2022-06-25 | End: 2022-06-27

## 2022-06-25 RX ORDER — DIBUCAINE 1 %
1 OINTMENT (GRAM) RECTAL EVERY 6 HOURS
Refills: 0 | Status: DISCONTINUED | OUTPATIENT
Start: 2022-06-25 | End: 2022-06-27

## 2022-06-25 RX ORDER — AER TRAVELER 0.5 G/1
1 SOLUTION RECTAL; TOPICAL EVERY 4 HOURS
Refills: 0 | Status: DISCONTINUED | OUTPATIENT
Start: 2022-06-25 | End: 2022-06-27

## 2022-06-25 RX ORDER — KETOROLAC TROMETHAMINE 30 MG/ML
30 SYRINGE (ML) INJECTION ONCE
Refills: 0 | Status: DISCONTINUED | OUTPATIENT
Start: 2022-06-25 | End: 2022-06-25

## 2022-06-25 RX ORDER — OXYCODONE HYDROCHLORIDE 5 MG/1
5 TABLET ORAL ONCE
Refills: 0 | Status: DISCONTINUED | OUTPATIENT
Start: 2022-06-25 | End: 2022-06-27

## 2022-06-25 RX ORDER — HYDROCORTISONE 1 %
1 OINTMENT (GRAM) TOPICAL EVERY 6 HOURS
Refills: 0 | Status: DISCONTINUED | OUTPATIENT
Start: 2022-06-25 | End: 2022-06-27

## 2022-06-25 RX ORDER — OXYTOCIN 10 UNIT/ML
333.33 VIAL (ML) INJECTION
Qty: 20 | Refills: 0 | Status: DISCONTINUED | OUTPATIENT
Start: 2022-06-25 | End: 2022-06-25

## 2022-06-25 RX ORDER — MORPHINE SULFATE 50 MG/1
2 CAPSULE, EXTENDED RELEASE ORAL ONCE
Refills: 0 | Status: DISCONTINUED | OUTPATIENT
Start: 2022-06-25 | End: 2022-06-25

## 2022-06-25 RX ORDER — NALOXONE HYDROCHLORIDE 4 MG/.1ML
0.1 SPRAY NASAL
Refills: 0 | Status: DISCONTINUED | OUTPATIENT
Start: 2022-06-25 | End: 2022-06-25

## 2022-06-25 RX ORDER — ONDANSETRON 8 MG/1
4 TABLET, FILM COATED ORAL EVERY 6 HOURS
Refills: 0 | Status: DISCONTINUED | OUTPATIENT
Start: 2022-06-25 | End: 2022-06-25

## 2022-06-25 RX ORDER — DEXAMETHASONE 0.5 MG/5ML
4 ELIXIR ORAL EVERY 6 HOURS
Refills: 0 | Status: DISCONTINUED | OUTPATIENT
Start: 2022-06-25 | End: 2022-06-25

## 2022-06-25 RX ORDER — ACETAMINOPHEN 500 MG
1000 TABLET ORAL ONCE
Refills: 0 | Status: COMPLETED | OUTPATIENT
Start: 2022-06-25 | End: 2022-06-25

## 2022-06-25 RX ORDER — OXYTOCIN 10 UNIT/ML
333.33 VIAL (ML) INJECTION
Qty: 20 | Refills: 0 | Status: DISCONTINUED | OUTPATIENT
Start: 2022-06-25 | End: 2022-06-27

## 2022-06-25 RX ORDER — LANOLIN
1 OINTMENT (GRAM) TOPICAL EVERY 6 HOURS
Refills: 0 | Status: DISCONTINUED | OUTPATIENT
Start: 2022-06-25 | End: 2022-06-27

## 2022-06-25 RX ORDER — IBUPROFEN 200 MG
600 TABLET ORAL EVERY 6 HOURS
Refills: 0 | Status: COMPLETED | OUTPATIENT
Start: 2022-06-25 | End: 2023-05-24

## 2022-06-25 RX ORDER — BENZOCAINE 10 %
1 GEL (GRAM) MUCOUS MEMBRANE EVERY 6 HOURS
Refills: 0 | Status: DISCONTINUED | OUTPATIENT
Start: 2022-06-25 | End: 2022-06-27

## 2022-06-25 RX ORDER — MAGNESIUM HYDROXIDE 400 MG/1
30 TABLET, CHEWABLE ORAL
Refills: 0 | Status: DISCONTINUED | OUTPATIENT
Start: 2022-06-25 | End: 2022-06-27

## 2022-06-25 RX ORDER — TETANUS TOXOID, REDUCED DIPHTHERIA TOXOID AND ACELLULAR PERTUSSIS VACCINE, ADSORBED 5; 2.5; 8; 8; 2.5 [IU]/.5ML; [IU]/.5ML; UG/.5ML; UG/.5ML; UG/.5ML
0.5 SUSPENSION INTRAMUSCULAR ONCE
Refills: 0 | Status: DISCONTINUED | OUTPATIENT
Start: 2022-06-25 | End: 2022-06-27

## 2022-06-25 RX ORDER — SIMETHICONE 80 MG/1
80 TABLET, CHEWABLE ORAL EVERY 4 HOURS
Refills: 0 | Status: DISCONTINUED | OUTPATIENT
Start: 2022-06-25 | End: 2022-06-27

## 2022-06-25 RX ORDER — OXYCODONE HYDROCHLORIDE 5 MG/1
5 TABLET ORAL
Refills: 0 | Status: DISCONTINUED | OUTPATIENT
Start: 2022-06-25 | End: 2022-06-27

## 2022-06-25 RX ORDER — SODIUM CHLORIDE 9 MG/ML
1000 INJECTION, SOLUTION INTRAVENOUS
Refills: 0 | Status: DISCONTINUED | OUTPATIENT
Start: 2022-06-25 | End: 2022-06-25

## 2022-06-25 RX ORDER — PRAMOXINE HYDROCHLORIDE 150 MG/15G
1 AEROSOL, FOAM RECTAL EVERY 4 HOURS
Refills: 0 | Status: DISCONTINUED | OUTPATIENT
Start: 2022-06-25 | End: 2022-06-27

## 2022-06-25 RX ADMIN — Medication 400 MILLIGRAM(S): at 20:01

## 2022-06-25 RX ADMIN — Medication 30 MILLIGRAM(S): at 18:30

## 2022-06-25 RX ADMIN — Medication 975 MILLIGRAM(S): at 20:00

## 2022-06-25 RX ADMIN — MORPHINE SULFATE 2 MILLIGRAM(S): 50 CAPSULE, EXTENDED RELEASE ORAL at 21:00

## 2022-06-25 RX ADMIN — Medication 0.2 MILLIGRAM(S): at 19:40

## 2022-06-25 NOTE — OB RN PATIENT PROFILE - FALL HARM RISK - UNIVERSAL INTERVENTIONS
Bed in lowest position, wheels locked, appropriate side rails in place/Call bell, personal items and telephone in reach/Instruct patient to call for assistance before getting out of bed or chair/Non-slip footwear when patient is out of bed/[place] to call system/Physically safe environment - no spills, clutter or unnecessary equipment/Purposeful Proactive Rounding/Room/bathroom lighting operational, light cord in reach

## 2022-06-25 NOTE — OB PROVIDER H&P - ATTENDING COMMENTS
Pt is a 25 y/o  @ 40w2d GA, Rh neg s/p rhogam (on 2022), GBS neg; in active  labor  -admit   -iv access for epidural   -re-evaluate prn

## 2022-06-25 NOTE — OB PROVIDER H&P - NSHPPHYSICALEXAM_GEN_ALL_CORE
Vital Signs Last 24 Hrs  T(C): 37.1 (25 Jun 2022 16:23), Max: 37.1 (25 Jun 2022 16:23)  T(F): 98.8 (25 Jun 2022 16:23), Max: 98.8 (25 Jun 2022 16:23)  HR: 81 (25 Jun 2022 17:30) (71 - 105)  BP: 113/71 (25 Jun 2022 17:27) (93/50 - 125/67)  RR: 18 (25 Jun 2022 16:23) (18 - 18)  SpO2: 100% (25 Jun 2022 17:30) (98% - 100%)    GA: AOX3, no apparent distress  Resp: CTAB  Cardio: RRR  Abd: soft, nontender, no palpable ctx, gravid  Extr: no erythema or pain to palpation bilaterally   SVE: 7/80/-2 per Dr. Randolph    EFM: 110 bpm/ moderate variabilty/ pos accels/ no decels/ cat 1   [place]: Q1-2min Vital Signs Last 24 Hrs  T(C): 37.1 (25 Jun 2022 16:23), Max: 37.1 (25 Jun 2022 16:23)  T(F): 98.8 (25 Jun 2022 16:23), Max: 98.8 (25 Jun 2022 16:23)  HR: 81 (25 Jun 2022 17:30) (71 - 105)  BP: 113/71 (25 Jun 2022 17:27) (93/50 - 125/67)  RR: 18 (25 Jun 2022 16:23) (18 - 18)  SpO2: 100% (25 Jun 2022 17:30) (98% - 100%)    GA: AOX3, no apparent distress  Resp: CTAB  Cardio: RRR  Abd: soft, nontender, palpable ctx, gravid  Extr: no erythema or pain to palpation bilaterally   SVE: 7/80/-2 per Dr. Randolph    EFM: 110 bpm/ moderate variabilty/ pos accels  [place]: Q1-2min

## 2022-06-25 NOTE — OB PROVIDER H&P - HISTORY OF PRESENT ILLNESS
26 y.o.  @40w2d by 1st trimester sonogram, presenting to the L&D for contractions since the morning. Patient reports contractions since the morning . Reports fetal movement. Denies LOF and vaginal bleeding. Rh negative, received rhogam on 2022 26 y.o.  @40w2d by 1st trimester sonogram, presenting to the L&D for contractions since the morning. Patient reports contractions increasing in intensity and frequency. Reports fetal movement. Denies LOF and vaginal bleeding. GBS negative   Pregnancy complicated by:   -Rh negative, received rhogam on 2022

## 2022-06-25 NOTE — CHART NOTE - NSCHARTNOTEFT_GEN_A_CORE
PGY-4 Note:    **Delayed entry due to clinical duties**     Pt seen and examined at bedside after nursing staff reported increased vaginal bleeding on fundal checks, associated with abdominal pain.     Vital Signs Last 24 Hrs  T(C): 37.1 (2022 16:23), Max: 37.1 (2022 16:23)  T(F): 98.8 (2022 16:23), Max: 98.8 (2022 16:23)  HR: 69 (2022 20:43) (68 - 107)  BP: 116/83 (2022 20:43) (93/50 - 147/93)  RR: 18 (2022 16:23) (18 - 18)  SpO2: 100% (2022 18:10) (98% - 100%)    GA: AOX3, mild distress  Abd: soft, uterus at the level of the umbilicus   SVE: 500cc of clots removed; lower uterine segment atony noted      A/P: 27 yo now P5 PPD#0 s/p  complicated by postpartum bleeding with an EBL of 500cc likely secondary to uterine atony s/p IM [email or web address]  -s/p IM [email or web address]  -2nd IV access obtained   -continue routine postpartum IV pitocin   -STAT CBC, coags, T&S    Dr. Hayes aware PGY-4 Note:    **Delayed entry due to clinical duties**     Pt seen and examined at bedside after nursing staff reported increased vaginal bleeding on fundal checks, associated with abdominal pain.     Vital Signs Last 24 Hrs  T(C): 37.1 (2022 16:23), Max: 37.1 (2022 16:23)  T(F): 98.8 (2022 16:23), Max: 98.8 (2022 16:23)  HR: 69 (2022 20:43) (68 - 107)  BP: 116/83 (2022 20:43) (93/50 - 147/93)  RR: 18 (2022 16:23) (18 - 18)  SpO2: 100% (2022 18:10) (98% - 100%)    GA: AOX3, mild distress  Abd: soft, uterus at the level of the umbilicus   SVE: 500cc of clots removed; lower uterine segment atony noted      A/P: 25 yo now P5 PPD#0 s/p  complicated by postpartum bleeding with an EBL of 500cc likely secondary to uterine atony s/p IM [email or web address]  -s/p IM [email or web address]  -2nd IV access obtained   -continue routine postpartum IV pitocin   -STAT CBC, coags, T&S  -will give PO methergine for 24 hours     Dr. Hayes aware

## 2022-06-25 NOTE — OB RN DELIVERY SUMMARY - NSSELHIDDEN_OBGYN_ALL_OB_FT
[NS_DeliveryAttending1_OBGYN_ALL_OB_FT:[email or web address]=],[NS_DeliveryRN_OBGYN_ALL_OB_FT:[id number]=],[NS_CirculateRN2_OBGYN_ALL_OB_FT:[id number]=]

## 2022-06-25 NOTE — OB PROVIDER H&P - NSHPLABSRESULTS_GEN_ALL_CORE
GCT: 148    U/S:  12/9/21 - NL NT 1.3MM, CRL  03/08/22 - 746g, 48%, Ant Plac, CL 3.7cm , NML Anatomy

## 2022-06-25 NOTE — OB PROVIDER H&P - ASSESSMENT
27 y/o  @ 40w2d GA, Rh neg s/p rhogam (on 2022), GBS neg; in early labor    -admit to labor and delivery  -pain management prn   -continous efm & toco  -admission labs  -IV access   -IV hydration   -diet: clear liquid diet   -covid swab    Dr. Randolph and Dr. Hayes aware   A/P: 25 y/o  @ 40w2d GA, Rh neg s/p rhogam (on 2022), GBS neg; in early labor    -admit to labor and delivery  -pain management prn   -continous efm & toco  -admission labs  -IV access   -IV hydration   -diet: clear liquid diet   -covid swab    Dr. Randolph and Dr. Hayes aware

## 2022-06-25 NOTE — PROCEDURE NOTE - ADDITIONAL PROCEDURE DETAILS
VSS post epidural placement  Analgesia at T10 R=L  FH wnl VSS post epidural placement  Analgesia at T10 R=L  FH wnl        @1741  Vital signs stable  No anesthesia complications from labor epidural  Patient discharged to OB post partum care as per policy

## 2022-06-25 NOTE — OB PROVIDER DELIVERY SUMMARY - NSPROVIDERDELIVERYNOTE_OBGYN_ALL_OB_FT
Patient was fully dilated and pushed. NSD live male , Apgars 9/9 . Vtx delivered OA   ,  Fetal head restituted to LOT. The anterior and posterior shoulders delivered, followed by the remaining body atraumatically. Delayed cord clamping was performed, and then clamped and cut. Cord blood & gases collected. The  was handed to mother for skin to skin. The placenta delivered spontaneously intact with 3v cord. Uterus massaged and firm with oxytocin given IV. Cervix, vagina and perineum inspected and intact.   EBL -300cc, hemostasis assured.

## 2022-06-25 NOTE — OB PROVIDER DELIVERY SUMMARY - NSSELHIDDEN_OBGYN_ALL_OB_FT
[NS_DeliveryAttending1_OBGYN_ALL_OB_FT:[email or web address]=],[NS_DeliveryRN_OBGYN_ALL_OB_FT:[id number]=],[NS_CirculateRN2_OBGYN_ALL_OB_FT:[id number]=]

## 2022-06-26 LAB
BASOPHILS # BLD AUTO: 0.03 K/UL — SIGNIFICANT CHANGE UP (ref 0–0.2)
BASOPHILS NFR BLD AUTO: 0.4 % — SIGNIFICANT CHANGE UP (ref 0–1)
COVID-19 SPIKE DOMAIN AB INTERP: POSITIVE
COVID-19 SPIKE DOMAIN ANTIBODY RESULT: >250 U/ML — HIGH
EOSINOPHIL # BLD AUTO: 0.02 K/UL — SIGNIFICANT CHANGE UP (ref 0–0.7)
EOSINOPHIL NFR BLD AUTO: 0.2 % — SIGNIFICANT CHANGE UP (ref 0–8)
FETAL SCREEN: SIGNIFICANT CHANGE UP
HCT VFR BLD CALC: 27.4 % — LOW (ref 37–47)
HGB BLD-MCNC: 8.6 G/DL — LOW (ref 12–16)
IMM GRANULOCYTES NFR BLD AUTO: 0.5 % — HIGH (ref 0.1–0.3)
LYMPHOCYTES # BLD AUTO: 1.2 K/UL — SIGNIFICANT CHANGE UP (ref 1.2–3.4)
LYMPHOCYTES # BLD AUTO: 14.8 % — LOW (ref 20.5–51.1)
MCHC RBC-ENTMCNC: 21.8 PG — LOW (ref 27–31)
MCHC RBC-ENTMCNC: 31.4 G/DL — LOW (ref 32–37)
MCV RBC AUTO: 69.5 FL — LOW (ref 81–99)
MONOCYTES # BLD AUTO: 0.39 K/UL — SIGNIFICANT CHANGE UP (ref 0.1–0.6)
MONOCYTES NFR BLD AUTO: 4.8 % — SIGNIFICANT CHANGE UP (ref 1.7–9.3)
NEUTROPHILS # BLD AUTO: 6.42 K/UL — SIGNIFICANT CHANGE UP (ref 1.4–6.5)
NEUTROPHILS NFR BLD AUTO: 79.3 % — HIGH (ref 42.2–75.2)
NRBC # BLD: 0 /100 WBCS — SIGNIFICANT CHANGE UP (ref 0–0)
PLATELET # BLD AUTO: 166 K/UL — SIGNIFICANT CHANGE UP (ref 130–400)
RBC # BLD: 3.94 M/UL — LOW (ref 4.2–5.4)
RBC # FLD: 17.4 % — HIGH (ref 11.5–14.5)
SARS-COV-2 IGG+IGM SERPL QL IA: >250 U/ML — HIGH
SARS-COV-2 IGG+IGM SERPL QL IA: POSITIVE
WBC # BLD: 8.1 K/UL — SIGNIFICANT CHANGE UP (ref 4.8–10.8)
WBC # FLD AUTO: 8.1 K/UL — SIGNIFICANT CHANGE UP (ref 4.8–10.8)

## 2022-06-26 PROCEDURE — 74177 CT ABD & PELVIS W/CONTRAST: CPT | Mod: 26

## 2022-06-26 RX ORDER — IBUPROFEN 200 MG
600 TABLET ORAL EVERY 6 HOURS
Refills: 0 | Status: DISCONTINUED | OUTPATIENT
Start: 2022-06-26 | End: 2022-06-27

## 2022-06-26 RX ADMIN — Medication 0.2 MILLIGRAM(S): at 14:15

## 2022-06-26 RX ADMIN — SODIUM CHLORIDE 3 MILLILITER(S): 9 INJECTION INTRAMUSCULAR; INTRAVENOUS; SUBCUTANEOUS at 23:13

## 2022-06-26 RX ADMIN — Medication 0.2 MILLIGRAM(S): at 06:33

## 2022-06-26 RX ADMIN — Medication 600 MILLIGRAM(S): at 18:34

## 2022-06-26 RX ADMIN — Medication 975 MILLIGRAM(S): at 20:48

## 2022-06-26 RX ADMIN — Medication 0.2 MILLIGRAM(S): at 01:54

## 2022-06-26 RX ADMIN — Medication 975 MILLIGRAM(S): at 02:30

## 2022-06-26 RX ADMIN — Medication 600 MILLIGRAM(S): at 12:14

## 2022-06-26 RX ADMIN — Medication 600 MILLIGRAM(S): at 06:32

## 2022-06-26 RX ADMIN — Medication 975 MILLIGRAM(S): at 15:45

## 2022-06-26 RX ADMIN — Medication 600 MILLIGRAM(S): at 17:57

## 2022-06-26 RX ADMIN — Medication 975 MILLIGRAM(S): at 15:04

## 2022-06-26 RX ADMIN — Medication 975 MILLIGRAM(S): at 08:55

## 2022-06-26 RX ADMIN — Medication 600 MILLIGRAM(S): at 13:00

## 2022-06-26 RX ADMIN — Medication 975 MILLIGRAM(S): at 09:15

## 2022-06-26 RX ADMIN — Medication 600 MILLIGRAM(S): at 23:13

## 2022-06-26 RX ADMIN — Medication 1 TABLET(S): at 12:15

## 2022-06-26 RX ADMIN — SODIUM CHLORIDE 3 MILLILITER(S): 9 INJECTION INTRAMUSCULAR; INTRAVENOUS; SUBCUTANEOUS at 06:33

## 2022-06-26 RX ADMIN — Medication 0.2 MILLIGRAM(S): at 20:48

## 2022-06-26 NOTE — PROGRESS NOTE ADULT - ASSESSMENT
A/P: 25 yo P5 S/P  PPD#1, rh neg, EBL 300cc+500cc PP, s/p IM methergineX1, s/p PO methergine for 24 hours, recovering well   - pain management with PO pain meds   - monitor vitals/bleeding   - ambulation/PO hydration  - regular diet as tolerated   - routine postpartum care     Dr Burris and Dr Hayes aware

## 2022-06-27 ENCOUNTER — TRANSCRIPTION ENCOUNTER (OUTPATIENT)
Age: 27
End: 2022-06-27

## 2022-06-27 VITALS — HEART RATE: 61 BPM | DIASTOLIC BLOOD PRESSURE: 60 MMHG | SYSTOLIC BLOOD PRESSURE: 119 MMHG

## 2022-06-27 LAB
BASOPHILS # BLD AUTO: 0.05 K/UL — SIGNIFICANT CHANGE UP (ref 0–0.2)
BASOPHILS NFR BLD AUTO: 0.6 % — SIGNIFICANT CHANGE UP (ref 0–1)
EOSINOPHIL # BLD AUTO: 0.13 K/UL — SIGNIFICANT CHANGE UP (ref 0–0.7)
EOSINOPHIL NFR BLD AUTO: 1.7 % — SIGNIFICANT CHANGE UP (ref 0–8)
HCT VFR BLD CALC: 26.5 % — LOW (ref 37–47)
HGB BLD-MCNC: 8 G/DL — LOW (ref 12–16)
IMM GRANULOCYTES NFR BLD AUTO: 0.4 % — HIGH (ref 0.1–0.3)
LYMPHOCYTES # BLD AUTO: 1.63 K/UL — SIGNIFICANT CHANGE UP (ref 1.2–3.4)
LYMPHOCYTES # BLD AUTO: 21.2 % — SIGNIFICANT CHANGE UP (ref 20.5–51.1)
MCHC RBC-ENTMCNC: 21.4 PG — LOW (ref 27–31)
MCHC RBC-ENTMCNC: 30.2 G/DL — LOW (ref 32–37)
MCV RBC AUTO: 70.9 FL — LOW (ref 81–99)
MONOCYTES # BLD AUTO: 0.43 K/UL — SIGNIFICANT CHANGE UP (ref 0.1–0.6)
MONOCYTES NFR BLD AUTO: 5.6 % — SIGNIFICANT CHANGE UP (ref 1.7–9.3)
NEUTROPHILS # BLD AUTO: 5.43 K/UL — SIGNIFICANT CHANGE UP (ref 1.4–6.5)
NEUTROPHILS NFR BLD AUTO: 70.5 % — SIGNIFICANT CHANGE UP (ref 42.2–75.2)
NRBC # BLD: 0 /100 WBCS — SIGNIFICANT CHANGE UP (ref 0–0)
PLATELET # BLD AUTO: 162 K/UL — SIGNIFICANT CHANGE UP (ref 130–400)
RBC # BLD: 3.74 M/UL — LOW (ref 4.2–5.4)
RBC # FLD: 17.7 % — HIGH (ref 11.5–14.5)
WBC # BLD: 7.7 K/UL — SIGNIFICANT CHANGE UP (ref 4.8–10.8)
WBC # FLD AUTO: 7.7 K/UL — SIGNIFICANT CHANGE UP (ref 4.8–10.8)

## 2022-06-27 RX ORDER — ACETAMINOPHEN 500 MG
3 TABLET ORAL
Qty: 0 | Refills: 0 | DISCHARGE
Start: 2022-06-27

## 2022-06-27 RX ORDER — IBUPROFEN 200 MG
1 TABLET ORAL
Qty: 0 | Refills: 0 | DISCHARGE
Start: 2022-06-27

## 2022-06-27 RX ADMIN — SODIUM CHLORIDE 3 MILLILITER(S): 9 INJECTION INTRAMUSCULAR; INTRAVENOUS; SUBCUTANEOUS at 05:59

## 2022-06-27 RX ADMIN — Medication 600 MILLIGRAM(S): at 05:59

## 2022-06-27 RX ADMIN — Medication 1 TABLET(S): at 12:08

## 2022-06-27 RX ADMIN — Medication 600 MILLIGRAM(S): at 07:15

## 2022-06-27 RX ADMIN — Medication 975 MILLIGRAM(S): at 08:49

## 2022-06-27 RX ADMIN — Medication 600 MILLIGRAM(S): at 12:08

## 2022-06-27 RX ADMIN — Medication 975 MILLIGRAM(S): at 08:03

## 2022-06-27 RX ADMIN — Medication 600 MILLIGRAM(S): at 12:34

## 2022-06-27 NOTE — DISCHARGE NOTE OB - HOSPITAL COURSE
pt admitted in labor, delivery complicated by poor pain control postpartum despite IV pain medication, CT was done and intraabdominal bleed ruled out, patient recovered with additional medication, discharged in stable condition

## 2022-06-27 NOTE — DISCHARGE NOTE OB - CARE PLAN
1 Principal Discharge DX:	Normal vaginal delivery  Assessment and plan of treatment:	No heavy lifting.   Nothing inside the vagina for 6 weeks: no tampons, douching, sexual intercourse, tub baths or pools.   If you have a fever over 100.4F, severe abdominal pain or heavy vaginal bleeding please call your doctor or go to the emergency room.  Please follow up with your OBGYN in 6 weeks for a postpartum visit.   Ketoconazole Pregnancy And Lactation Text: This medication is Pregnancy Category C and it isn't know if it is safe during pregnancy. It is also excreted in breast milk and breast feeding isn't recommended.

## 2022-06-27 NOTE — DISCHARGE NOTE OB - NS MD DC FALL RISK RISK
For information on Fall & Injury Prevention, visit: [email or web address] OR  [email or web address] OR  https://www.cdc.gov/steadi/patient.html

## 2022-06-27 NOTE — PROGRESS NOTE ADULT - SUBJECTIVE AND OBJECTIVE BOX
OB attending  PPD #2    Pt doing well, pain well controlled. No overnight events, no acute complaints.    Ambulating: Yes  Voiding: Yes  Flatus: Yes  Bowel movements: Yes   Breast or bottle feeding: Breastfeeding  Diet: Regular    PAST MEDICAL & SURGICAL HISTORY:  No pertinent past medical history      History of D&amp;C  X2 for MABX2          Physical Exam  Vital Signs Last 24 Hrs  T(C): 35.8 (2022 08:04), Max: 36.7 (2022 17:30)  T(F): 96.5 (2022 08:04), Max: 98 (2022 17:30)  HR: 54 (2022 08:04) (54 - 63)  BP: 99/64 (2022 08:04) (99/64 - 116/76)  BP(mean): --  RR: 18 (2022 08:04) (18 - 18)  SpO2: --  Gen: AAOx3, NAD  Abd: Soft, nontender, nondistended, BS+  Fundus: Firm, below umbilicus  Lochia: normal  Ext: No calf tenderness, no swelling    Labs:                        8.6    8.10  )-----------( 166      ( 2022 09:46 )             27.4     Rh+    A/P: s/p , PPD #2, doing well, no significant pain noted  - continue current management  -d/c home   -f/u 4-6 wks for pp exam 
PGY2 Note:    Pt seen and evaluated at bedside. Pain well controlled. Vaginal bleeding minimal. Denies dizziness/lightheadedness/CP/SOB/palpitations. Denies fever, chills, nausea/vomiting, diarrhea, dysuria, LE pain.     Ambulating: Yes  Voiding: Yes  Breast or bottle feeding: Breast  Diet: Tolerating regular diet    Physical Exam  Vital Signs Last 24 Hrs  T(C): 36.2 (26 Jun 2022 07:52), Max: 36.7 (26 Jun 2022 00:15)  T(F): 97.2 (26 Jun 2022 07:52), Max: 98.1 (26 Jun 2022 00:15)  HR: 63 (26 Jun 2022 07:52) (59 - 107)  BP: 109/72 (26 Jun 2022 07:52) (95/60 - 147/93)  RR: 18 (26 Jun 2022 07:52) (18 - 20)  SpO2: 100% (25 Jun 2022 18:10) (99% - 100%)    Gen: AAOx3, NAD  Fundus: firm, below umbilicus   Abd: Soft, nontender, nondistended  Lochia: minimal   Ext: No calf tenderness, no swelling    Labs:                        8.6    8.10  )-----------( 166      ( 26 Jun 2022 09:46 )             27.4                         9.2    10.54 )-----------( 207      ( 25 Jun 2022 19:50 )             28.7        MEDICATIONS  (STANDING):  acetaminophen     Tablet .. 975 milliGRAM(s) Oral <User Schedule>  diphtheria/tetanus/pertussis (acellular) Vaccine (ADAcel) 0.5 milliLiter(s) IntraMuscular once  ibuprofen  Tablet. 600 milliGRAM(s) Oral every 6 hours  methylergonovine 0.2 milliGRAM(s) Oral every 6 hours  oxytocin Infusion 333.333 milliUNIT(s)/Min (1000 mL/Hr) IV Continuous <Continuous>  prenatal multivitamin 1 Tablet(s) Oral daily  sodium chloride 0.9% lock flush 3 milliLiter(s) IV Push every 8 hours    MEDICATIONS  (PRN):  benzocaine 20%/menthol 0.5% Spray 1 Spray(s) Topical every 6 hours PRN for Perineal discomfort  dibucaine 1% Ointment 1 Application(s) Topical every 6 hours PRN Perineal discomfort  diphenhydrAMINE 25 milliGRAM(s) Oral every 6 hours PRN Pruritus  hydrocortisone 1% Cream 1 Application(s) Topical every 6 hours PRN Moderate Pain (4-6)  lanolin Ointment 1 Application(s) Topical every 6 hours PRN nipple soreness  magnesium hydroxide Suspension 30 milliLiter(s) Oral two times a day PRN Constipation  oxyCODONE    IR 5 milliGRAM(s) Oral every 3 hours PRN Moderate to Severe Pain (4-10)  oxyCODONE    IR 5 milliGRAM(s) Oral once PRN Moderate to Severe Pain (4-10)  pramoxine 1%/zinc 5% Cream 1 Application(s) Topical every 4 hours PRN Moderate Pain (4-6)  simethicone 80 milliGRAM(s) Chew every 4 hours PRN Gas  witch hazel Pads 1 Application(s) Topical every 4 hours PRN Perineal discomfort

## 2022-06-27 NOTE — DISCHARGE NOTE OB - CARE PROVIDER_API CALL
Fahad Hayes)  Obstetrics and Gynecology  [address] [place] [address]  Phone: [phone]  Fax: [phone]  Follow Up Time:

## 2022-06-27 NOTE — DISCHARGE NOTE OB - MATERIALS PROVIDED
Guide to Postpartum Care/Back To Sleep Handout/Shaken Baby Prevention Handout/Birth Certificate Instructions/Discharge Medication Information for Patients and Families Pocket Guide

## 2022-06-27 NOTE — DISCHARGE NOTE OB - PATIENT PORTAL LINK FT
You can access the FollowMyHealth Patient Portal offered by [facility] by registering at the following website: [email or web address]. By joining [employer]’s FollowMyHealth portal, you will also be able to view your health information using other applications (apps) compatible with our system.

## 2022-06-27 NOTE — DISCHARGE NOTE OB - IF YOU ARE NOT BREASTFEEDING, USE ICE PACKS AND AVOID STIMULATION OF NIPPLES/BREASTS; WHEN SHOWERING REDIRECT FLOW OF WARM WATER AWAY FROM BREASTS
Abnormal stress nuc study  Ant ishcemia - SDS 5 ( intermediate risk) . Pt continues to have dyspnea  Will proceed with cardiac cath    [facility] +/- PCI/RHC consent    The risks (including but not limited to death, myocardial infarction, cerebrovascular accident, dysrhythmia, renal failure +/-dialysis, vascular complication, allergy, and/or need for emergency surgery), indications, benefits, and alternatives of cardiac catheterization +/- PCI have been explained in detail to the patient and family. Patient and family informed that if patient needs surgery  - it will be at [facility] as [address] [place] [address] does not have onsite surgery. All questions answered to patient's satisfaction. Patient agrees to proceed with the procedure and  informed consent was obtained. Patient evaluated andcan be aDES candidate.         ASA 2  AW 2    Gallo Bartlett MD., [facility] [place]
Statement Selected

## 2022-06-30 DIAGNOSIS — O48.0 POST-TERM PREGNANCY: ICD-10-CM

## 2022-06-30 DIAGNOSIS — Z67.11 TYPE A BLOOD, RH NEGATIVE: ICD-10-CM

## 2022-06-30 DIAGNOSIS — Z20.822 CONTACT WITH AND (SUSPECTED) EXPOSURE TO COVID-19: ICD-10-CM

## 2022-06-30 DIAGNOSIS — O26.893 OTHER SPECIFIED PREGNANCY RELATED CONDITIONS, THIRD TRIMESTER: ICD-10-CM

## 2022-06-30 DIAGNOSIS — Z3A.40 40 WEEKS GESTATION OF PREGNANCY: ICD-10-CM

## 2022-07-15 NOTE — ASU DISCHARGE PLAN (ADULT/PEDIATRIC) - FOLLOW UP APPOINTMENTS
[facility]:  [facility] Follicular Atypia Histology Text: Follicular atypia was noted and reviewed with patient, patient was advised to monitor and report any skin changes.

## 2022-08-03 ENCOUNTER — APPOINTMENT (OUTPATIENT)
Dept: OBGYN | Facility: CLINIC | Age: 27
End: 2022-08-03

## 2022-08-03 ENCOUNTER — NON-APPOINTMENT (OUTPATIENT)
Age: 27
End: 2022-08-03

## 2022-08-03 VITALS
DIASTOLIC BLOOD PRESSURE: 75 MMHG | WEIGHT: 108 LBS | BODY MASS INDEX: 20.39 KG/M2 | HEIGHT: 61 IN | SYSTOLIC BLOOD PRESSURE: 128 MMHG

## 2022-08-03 DIAGNOSIS — Z87.42 PERSONAL HISTORY OF OTHER DISEASES OF THE FEMALE GENITAL TRACT: ICD-10-CM

## 2022-08-03 DIAGNOSIS — Z78.9 OTHER SPECIFIED HEALTH STATUS: ICD-10-CM

## 2022-08-03 DIAGNOSIS — O02.1 MISSED ABORTION: ICD-10-CM

## 2022-08-03 DIAGNOSIS — Z98.890 OTHER SPECIFIED POSTPROCEDURAL STATES: ICD-10-CM

## 2022-08-03 PROCEDURE — 0503F POSTPARTUM CARE VISIT: CPT

## 2022-08-03 NOTE — HISTORY OF PRESENT ILLNESS
[Postpartum Follow Up] : postpartum follow up [] : delivered by vaginal delivery [Back to Normal] : is back to normal in size [Mild] : mild vaginal bleeding [Normal] : the vagina was normal [Not Done] : Examination of breasts not done [Doing Well] : is doing well [No Sign of Infection] : is showing no signs of infection [Excellent Pain Control] : has excellent pain control [None] : None [Complications:___] : no complications [Breastfeeding] : not currently nursing [S/Sx PP Depression] : no signs/symptoms of postpartum depression [Erythema] : not erythematous [Cervix Sample Taken] : cervical sample not taken for a Pap smear [FreeTextEntry1] : 25 y/o [id number] s/p nsd 6-9 lbs, here for pp.  No complaints.  no abnormal bleeding, pain or discharge.   + breastfeeding.  wants Mirena.\par \par phq 2 neg\par \par NSD x5, largest 6+ lbs, stop x 2\par no med hx\par no smoke/drugs/etoh\par \par wlll order IUD\par gc/ct sent from office

## 2022-08-08 LAB
C TRACH RRNA SPEC QL NAA+PROBE: NOT DETECTED
CYTOLOGY CVX/VAG DOC THIN PREP: NORMAL
N GONORRHOEA RRNA SPEC QL NAA+PROBE: NOT DETECTED
SOURCE TP AMPLIFICATION: NORMAL

## 2022-08-11 ENCOUNTER — APPOINTMENT (OUTPATIENT)
Dept: OBGYN | Facility: CLINIC | Age: 27
End: 2022-08-11

## 2022-08-11 VITALS
DIASTOLIC BLOOD PRESSURE: 70 MMHG | HEIGHT: 61 IN | BODY MASS INDEX: 19.83 KG/M2 | SYSTOLIC BLOOD PRESSURE: 102 MMHG | WEIGHT: 105 LBS

## 2022-08-11 DIAGNOSIS — Z30.430 ENCOUNTER FOR INSERTION OF INTRAUTERINE CONTRACEPTIVE DEVICE: ICD-10-CM

## 2022-08-11 LAB
BILIRUB UR QL STRIP: NORMAL
CLARITY UR: NORMAL
COLLECTION METHOD: NORMAL
GLUCOSE UR-MCNC: NORMAL
HCG UR QL: 0.2 EU/DL
HCG UR QL: NEGATIVE
HGB UR QL STRIP.AUTO: NORMAL
KETONES UR-MCNC: NORMAL
LEUKOCYTE ESTERASE UR QL STRIP: NORMAL
NITRITE UR QL STRIP: NORMAL
PH UR STRIP: 6.5
PROT UR STRIP-MCNC: NORMAL
SP GR UR STRIP: 1.03

## 2022-08-11 PROCEDURE — 76856 US EXAM PELVIC COMPLETE: CPT

## 2022-08-11 PROCEDURE — 58300 INSERT INTRAUTERINE DEVICE: CPT | Mod: 53

## 2022-08-11 PROCEDURE — 81003 URINALYSIS AUTO W/O SCOPE: CPT | Mod: QW

## 2022-08-11 PROCEDURE — 81025 URINE PREGNANCY TEST: CPT

## 2022-08-11 RX ORDER — NORELGESTROMIN AND ETHINYL ESTRADIOL 150; 35 UG/D; UG/D
150-35 PATCH TRANSDERMAL
Qty: 3 | Refills: 3 | Status: ACTIVE | COMMUNITY
Start: 2022-08-11 | End: 1900-01-01

## 2022-08-11 NOTE — PROCEDURE
[IUD Placement] : intrauterine device (IUD) placement [Time out performed] : Pre-procedure time out performed.  Patient's name, date of birth and procedure confirmed. [Consent Obtained] : Consent obtained [Prevention of Pregnancy] : prevention of pregnancy [Risks] : risks [Benefits] : benefits [Alternatives] : alternatives [Patient] : patient [Infection] : infection [Bleeding] : bleeding [Pain] : pain [Expulsion] : expulsion [Failure] : failure [Uterine Perforation] : uterine perforation [Neg Pregnancy Test] : negative pregnancy test [Neg GC/Chlamydia] : negative GC/Chlamydia [Ibuprofen ___ mg] : ibuprofen [unfilled] ~Umg [Tenaculum] : Tenaculum [Easy Passage] : Easy passage [Sounded to ___ cm] : sounded to [unfilled] ~Ucm [Post Placement Transvag. US] : post placement transvaginal ultrasound [Mirena IUD] : Mirena IUD [Tolerated Well] : Patient tolerated the procedure well [Vasovagal Reaction] : vasovagal reaction [Motrin/Ibuprofen] : Motrin/Ibuprofen [de-identified] : IUD failed to be placed with tip at fundus, given low placemetn advised removal and reattempt another day, patient desires xulane

## 2023-01-05 NOTE — PROCEDURE NOTE - NSTOLERANCE_GEN_A_CORE
Patient tolerated procedure well. Quinolones Counseling:  I discussed with the patient the risks of fluoroquinolones including but not limited to GI upset, allergic reaction, drug rash, diarrhea, dizziness, photosensitivity, yeast infections, liver function test abnormalities, tendonitis/tendon rupture.

## 2023-06-07 NOTE — ASU DISCHARGE PLAN (ADULT/PEDIATRIC) - CALL YOUR DOCTOR IF YOU HAVE ANY OF THE FOLLOWING:
Fever greater than (need to indicate Fahrenheit or Celsius)/Bleeding that does not stop/Pain not relieved by Medications
[Negative] : Heme/Lymph

## 2023-12-12 NOTE — OB PROVIDER H&P - NS_OBGYNHISTORY_OBGYN_ALL_OB_FT
Patient Education     Acute Bronchitis  Your healthcare provider has told you that you have acute bronchitis. Bronchitis is infection or inflammation of the airways in the lungs (bronchial tubes). Normally, air moves easily in and out of the airways. Bronchitis narrows the airways. This makes it harder for air to flow in and out of the lungs. This causes symptoms such as shortness of breath, coughing up yellow or green mucus, and wheezing.  Bronchitis can be acute or chronic. Acute means it happens quickly and goes away in a short time. Chronic means a condition lasts a long time and often comes back. Most people with acute bronchitis get better in 1 to 2 weeks.     What causes acute bronchitis?  Acute bronchitis is often caused by a virus such as a cold or the flu. In some cases, it may be caused by bacteria. Certain factors make it more likely for a cold or flu to turn into bronchitis. These include being very young, being elderly, having a heart or lung problem, or having a weak immune system. Cigarette smoking also makes bronchitis more likely.  When bronchitis develops, the airways become swollen. The airways may also become infected with bacteria. This is known as a secondary infection.  Symptoms of acute bronchitis  Symptoms can include:  Coughing with mucus  Wheezing  Feeling short of breath  Chest pain  Fever  Diagnosing acute bronchitis  Your healthcare provider will ask about your symptoms and health history. He or she will give you a physical exam. This will include listening to your lungs while you breathe. You may have a chest X-ray to look for infection in the lungs (pneumonia) if you have had a fever. You may also have blood tests to check for infection.  Treating acute bronchitis  Bronchitis usually goes away in 1 to 2 weeks without treatment. You can help feel better by:  Taking medicine as directed. Talk to your healthcare provider before taking any over-the-counter medicines (OTC). Some OTC  medicines help relieve inflammation in your bronchial tubes. They can also thin mucus. This makes it easier to cough up. Your healthcare provider may prescribe an inhaler to help open up the bronchial tubes. Most of the time, acute bronchitis is caused by a viral infection. Antibiotics are usually not prescribed for viral infections.  Drinking plenty of fluids, such as water, juice, or warm soup. Fluids loosen mucus so that you can cough it up. This helps you breathe more easily. Fluids also prevent dehydration.  Using a humidifier. This can help reduce coughing.  Getting plenty of rest  Not smoking. Also, don't let anyone else smoke in your home. In public places, move away from secondhand smoke.  Recovery and follow-up  Follow up with your doctor. You will likely feel better in 1 to 2 weeks. But you may have a dry cough for a longer time. Let your doctor know if you still have symptoms other than a dry cough after 2 weeks. Tell him or her if you get bronchial infections often.  Self-care tips  To get relief from your symptoms and prevent bronchitis:  Stop smoking. Stopping smoking is the most important step you can take to treat bronchitis. If you need help stopping smoking, talk with your healthcare provider.  Stay away from secondhand smoke and other irritants. Try to stay away from smoke, chemicals, fumes, and dust. Don’t let anyone smoke in your home. Stay indoors on smoggy days.  Prevent lung infections. Ask your healthcare provider about the flu and pneumonia vaccines. Take steps to prevent colds and other lung infections.  Wash your hands well. Wash your hands often with soap and water. Use hand  when you can’t wash your hands. Stay away from crowds during cold and flu season.  When to call your healthcare provider  Call the healthcare provider if you have any of these:  Fever of 100.4°F ( 38.0°C) or higher, or as directed by your healthcare provider  Symptoms that get worse, or new  symptoms  Breathing not getting better with treatments  Symptoms that don’t start to get better in 1 week  Suzanne last reviewed this educational content on 8/1/2019 © 2000-2020 [employer]. [address] [place] [address], [place] [address]. All rights reserved. This information is not intended as a substitute for professional medical care. Always follow your healthcare professional's instructions.            OBhx:     FT NSVDx4  Largest baby ~2900 grams  SABx2 with D&Cx2    GynHx:  Pt denies cysts, fibroids, abnormal pap, STI

## 2024-02-13 NOTE — PLAN
[FreeTextEntry1] : failed iud placement\par iud removed\par xulane sent; counseled on possible risk of decreased milk supply No

## 2024-07-11 ENCOUNTER — APPOINTMENT (OUTPATIENT)
Dept: OBGYN | Facility: CLINIC | Age: 29
End: 2024-07-11
Payer: MEDICAID

## 2024-07-11 VITALS — WEIGHT: 102 LBS | BODY MASS INDEX: 19.27 KG/M2 | DIASTOLIC BLOOD PRESSURE: 74 MMHG | SYSTOLIC BLOOD PRESSURE: 112 MMHG

## 2024-07-11 DIAGNOSIS — Z01.419 ENCOUNTER FOR GYNECOLOGICAL EXAMINATION (GENERAL) (ROUTINE) W/OUT ABNORMAL FINDINGS: ICD-10-CM

## 2024-07-11 DIAGNOSIS — Z30.40 ENCOUNTER FOR SURVEILLANCE OF CONTRACEPTIVES, UNSPECIFIED: ICD-10-CM

## 2024-07-11 LAB
BILIRUB UR QL STRIP: NORMAL
GLUCOSE UR-MCNC: NORMAL
HCG UR QL: NEGATIVE
HGB UR QL STRIP.AUTO: NORMAL
KETONES UR-MCNC: NORMAL
LEUKOCYTE ESTERASE UR QL STRIP: NORMAL
NITRITE UR QL STRIP: NORMAL
PH UR STRIP: 5.5
PROT UR STRIP-MCNC: NORMAL

## 2024-07-11 PROCEDURE — 76830 TRANSVAGINAL US NON-OB: CPT

## 2024-07-11 PROCEDURE — 99212 OFFICE O/P EST SF 10 MIN: CPT | Mod: 25

## 2024-07-11 PROCEDURE — 81003 URINALYSIS AUTO W/O SCOPE: CPT | Mod: QW

## 2024-07-11 PROCEDURE — 81025 URINE PREGNANCY TEST: CPT

## 2024-07-11 PROCEDURE — 99395 PREV VISIT EST AGE 18-39: CPT

## 2024-07-11 PROCEDURE — 99459 PELVIC EXAMINATION: CPT

## 2024-07-12 LAB
C TRACH RRNA SPEC QL NAA+PROBE: NOT DETECTED
N GONORRHOEA RRNA SPEC QL NAA+PROBE: NOT DETECTED
SOURCE AMPLIFICATION: NORMAL

## 2025-05-11 NOTE — DISCHARGE NOTE OB - WEAR SUPPORTIVE BRA
Statement Selected APPTS ARE READY TO BE MADE: [X] YES    Best Family or Patient Contact (if needed):    Additional Information about above appointments (if needed):    1:   2:   3:     Other comments or requests:

## 2025-06-12 NOTE — PACU DISCHARGE NOTE - NAUSEA/VOMITING:
Adderall 10 mg  Filled 5-17-25  Qty 30  0 refills  Future Appointments   Date Time Provider Department Center   9/29/2025  5:00 PM Wilian Polanco MD [facility] 8 [facility] 2-18-25 [facility]   None